# Patient Record
Sex: MALE | Race: WHITE | Employment: OTHER | URBAN - METROPOLITAN AREA
[De-identification: names, ages, dates, MRNs, and addresses within clinical notes are randomized per-mention and may not be internally consistent; named-entity substitution may affect disease eponyms.]

---

## 2017-08-30 ENCOUNTER — HOSPITAL ENCOUNTER (EMERGENCY)
Facility: CLINIC | Age: 82
Discharge: HOME | End: 2017-08-30
Attending: EMERGENCY MEDICINE | Admitting: EMERGENCY MEDICINE
Payer: MEDICARE

## 2017-08-30 ENCOUNTER — APPOINTMENT (OUTPATIENT)
Dept: CT IMAGING | Facility: CLINIC | Age: 82
End: 2017-08-30
Attending: EMERGENCY MEDICINE
Payer: MEDICARE

## 2017-08-30 VITALS
WEIGHT: 165 LBS | DIASTOLIC BLOOD PRESSURE: 78 MMHG | OXYGEN SATURATION: 95 % | RESPIRATION RATE: 18 BRPM | SYSTOLIC BLOOD PRESSURE: 174 MMHG | HEART RATE: 66 BPM | BODY MASS INDEX: 26.52 KG/M2 | TEMPERATURE: 99.3 F | HEIGHT: 66 IN

## 2017-08-30 DIAGNOSIS — S51.012A SKIN TEAR OF LEFT ELBOW WITHOUT COMPLICATION, INITIAL ENCOUNTER: ICD-10-CM

## 2017-08-30 DIAGNOSIS — S61.411A LACERATION OF RIGHT HAND, FOREIGN BODY PRESENCE UNSPECIFIED, INITIAL ENCOUNTER: ICD-10-CM

## 2017-08-30 DIAGNOSIS — S01.81XA LACERATION OF FOREHEAD, INITIAL ENCOUNTER: ICD-10-CM
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
## 2017-08-30 PROCEDURE — 12001 RPR S/N/AX/GEN/TRNK 2.5CM/<: CPT

## 2017-08-30 PROCEDURE — 99284 EMERGENCY DEPT VISIT MOD MDM: CPT | Mod: 25

## 2017-08-30 PROCEDURE — 70450 CT HEAD/BRAIN W/O DYE: CPT

## 2017-08-30 PROCEDURE — 12011 RPR F/E/E/N/L/M 2.5 CM/<: CPT

## 2017-08-30 RX ORDER — GINSENG 100 MG
CAPSULE ORAL
Status: DISCONTINUED
Start: 2017-08-30 | End: 2017-08-30 | Stop reason: HOSPADM

## 2017-08-30 RX ORDER — BUPIVACAINE HYDROCHLORIDE AND EPINEPHRINE 5; 5 MG/ML; UG/ML
INJECTION, SOLUTION PERINEURAL
Status: DISCONTINUED
Start: 2017-08-30 | End: 2017-08-30 | Stop reason: HOSPADM

## 2017-08-30 ASSESSMENT — ENCOUNTER SYMPTOMS
WOUND: 1
BACK PAIN: 0
NECK PAIN: 0

## 2017-08-30 NOTE — ED NOTES
ABCs intact. Pt tripped and fell forward onto kitchen floor. Pt has facial lacerations. Pt has skin tear to L elbow and wound to 3rd R finger. Pt is currently on Xarelto.

## 2017-08-30 NOTE — ED PROVIDER NOTES
"  History     Chief Complaint:  Mechanical Fall     HPI   Neo Marcos is a 85 year old male with a history of HTN and atrial fibrillation currently on Xarelto who presents to the emergency department for evaluation following a mechanical fall. The patient states that he was hurrying to answer the door this afternoon when he tripped, falling forward, striking his forehead and sustaining a laceration to his right hand and skin to his right elbow. He denies any loss of consciousness with this. He denies sustaining any other significant injuries as a result of this incident, including injury to his neck or back.     Allergies:  No Known Allergies     Medications:    Amlodipine  Atenolol  Atorvastatin  Proscar  Lisinopril  Multivitamins  Xarelto    Past Medical History:    atrial fibrillation  HTN  Non senile cataract    Past Surgical History:    ENT surgery  Eye surgery  Hernia repair  Orthopedic surgery    Family History:    No past pertinent family history.    Social History:  Former Smoker   Positive for alcohol use.   Marital Status:   [2]    Review of Systems   Musculoskeletal: Negative for back pain and neck pain.   Skin: Positive for wound.     Physical Exam   First Vitals:  BP: 144/73  Pulse: 66  Temp: 99.3  F (37.4  C)  Resp: 18  Height: 167.6 cm (5' 6\")  Weight: 74.8 kg (165 lb)  SpO2: 98 %      Physical Exam  Vital signs and nursing notes reviewed.     Constitutional: laying on gurney appears comfortable  HENT: Oropharynx is clear and moist, no intraoral injury.  Mid forehead laceration of 2 cm.  Nasal abrasion, without nasal bridge deformity.  No intranasal bleeding  Eyes: Conjunctivae are normal bilaterally. Pupils equal  Neck: normal range of motion, no midline neck pain  Cardiovascular: Normal rate, regular rhythm, normal heart sounds.   Pulmonary/Chest: Effort normal and breath sounds normal. No respiratory distress.   Abdominal: Soft. Bowel sounds are normal. No tenderness to palpation. " No rebound or guarding.   Musculoskeletal: No joint swelling or edema. No midline back pain. No rib pain or extremity long bone or joint injury  Neurological: Alert and oriented. No focal weakness.  No confusion  Skin: Skin is warm and dry. No rash noted. Superficial skin tear to left elbow.  Laceration/skin tear to right hand finger area.   Psych: normal affect      Emergency Department Course   Imaging:  Radiographic findings were communicated with the patient who voiced understanding of the findings.    CT Head without contrast:   1. Atrophy and chronic white matter disease.  2. Nothing acute.  3. No interval change. As per radiology.    Procedures:    Narrative: Procedure: Laceration Repair        LACERATION:  A simple clean 2 cm laceration.      LOCATION:  Forehead      ANESTHESIA:  Local using 0.5% Bupivacaine with epi total of 3 mLs      PREPARATION:  Irrigation and Scrubbing with Normal Saline and Shur Clens      DEBRIDEMENT:  no debridement      CLOSURE:  Wound was closed with One Layer.  Skin closed with 7 x 6.0 Ethylon using interrupted sutures.      Narrative: Procedure: Laceration Repair        LACERATION:  A simple clean 2 cm laceration.      LOCATION:  Right base between second and third fingers      FUNCTION:  Distally sensation, circulation, motor and tendon function are intact.      ANESTHESIA: Local using 0.5% Bupivacaine with epi total of 3 mLs      PREPARATION:  Irrigation and Scrubbing with Normal Saline and Shur Clens      DEBRIDEMENT:  no debridement      CLOSURE:  Wound was closed with One Layer.  Skin closed with 5 x 5.0 Ethylon using interrupted sutures.      Emergency Department Course:  Nursing notes and vitals reviewed. 1349 I performed an exam of the patient as documented above.     The patient was sent for a CT Head while in the emergency department, findings above.     1425 Laceration repairs were performed, as noted above.    Findings and plan explained to the Patient. Patient  discharged home with instructions regarding supportive care, medications, and reasons to return. The importance of close follow-up was reviewed.     Impression & Plan    Medical Decision Making:  The patient is an 84 yo male you presents to ED after sustaining a mechanical fall.  CT head was obtained as patient is on Xarelto, which was negative of intracranial pathology.  Head to toe exam revealed on lacerations without other findings of significant injury.  Lacerations and skin tears were repaired and dressed.  He was advised on potential delayed bleeding with head injuries while on anticoagulation and reasons to return.  Wound care instructions were provided.  He will have sutures removed in 5-7 days.      Diagnosis:    ICD-10-CM   1. Laceration of forehead, initial encounter S01.81XA   2. Fall, initial encounter W19.XXXA   3. Skin tear of left elbow without complication, initial encounter S51.012A   4. Laceration of right hand, foreign body presence unspecified, initial encounter S61.411A       Disposition:  discharged to home  Krysta PEREZ, am serving as a scribe on 8/30/2017 at 1:49 PM to personally document services performed by Jose Gibbons MD based on my observations and the provider's statements to me.     Krysta Brown  8/30/2017   [facility] EMERGENCY DEPARTMENT       Jose Gibbons MD  08/30/17 4027

## 2017-08-30 NOTE — ED AVS SNAPSHOT
[facility] Emergency Department    [address]    [facility] [address]    Phone:  [phone]    Fax:  [phone]                                       Neo Marcos   MRN: [id number]    Department:  [facility] Emergency Department   Date of Visit:  8/30/2017           After Visit Summary Signature Page     I have received my discharge instructions, and my questions have been answered. I have discussed any challenges I see with this plan with the nurse or doctor.    ..........................................................................................................................................  Patient/Patient Representative Signature      ..........................................................................................................................................  Patient Representative Print Name and Relationship to Patient    ..................................................               ................................................  Date                                            Time    ..........................................................................................................................................  Reviewed by Signature/Title    ...................................................              ..............................................  Date                                                            Time

## 2017-08-30 NOTE — DISCHARGE INSTRUCTIONS
Face Laceration: Suture or Tape  A laceration is a cut through the skin. This will require stitches if it is deep. Minor cuts may be treated with surgical tape.    Home care    Your healthcare provider may prescribe an antibiotic. This is to help prevent infection. Follow all instructions for taking this medicine. Take the medicine every day until it is gone or you are told to stop. You should not have any left over.    The healthcare provider may prescribe medicines for pain. Follow instructions for taking them.    Follow the healthcare provider s instructions on how to care for the cut.    Wash your hands with soap and warm water before and after caring for the cut. This helps prevent infection.    If a bandage was applied and it becomes wet or dirty, replace it. Otherwise, leave it in place for the first 24 hours, then change it once a day or as directed.    If sutures were used, clean the wound daily:    After removing the bandage, wash the area with soap and water. Use a wet cotton swab to loosen and remove any blood or crust that forms.    After cleaning, keep the wound clean and dry. Talk with your doctor before applying any antibiotic ointment to the wound. Reapply a fresh bandage.    You may remove the bandage to shower as usual after the first 24 hours, but do not soak the area in water (no swimming) until the sutures are removed.    If surgical tape was used, keep the area clean and dry. If it becomes wet, blot it dry with a towel.    Most facial skin wounds heal without problems. However, an infection sometimes occurs despite proper treatment. Therefore, watch for the signs of infection listed below.  Follow-up care  Follow up with your healthcare provider as advised. Be sure to return for suture removal as directed. Ask your provider how long sutures should remain in place. If surgical tape closures were used, you may remove them yourself when your provider recommends if they have not fallen off  on their own.  When to seek medical advice  Call your healthcare provider right away if any of these occur:    Wound bleeding not controlled by direct pressure    Signs of infection, including increasing pain in the wound, increasing wound redness or swelling, or pus or bad odor coming from the wound    Fever of 100.4 F (38 C) or higher or as directed by your healthcare provider    Stitches come apart or fall out or surgical tape falls off before 5 days    Wound edges re-open    Wound changes colors    Numbness around the wound   Date Last Reviewed: 6/14/2015 2000-2017 [employer]. [address] [place], [place] [address]. All rights reserved. This information is not intended as a substitute for professional medical care. Always follow your healthcare professional's instructions.

## 2017-08-30 NOTE — ED AVS SNAPSHOT
[facility] Emergency Department    [address]    [place] [address]    Phone:  [phone]    Fax:  [phone]                                       Neo Marcos   MRN: [id number]    Department:  [facility] Emergency Department   Date of Visit:  8/30/2017           Patient Information     Date Of Birth          9/5/1931        Your diagnoses for this visit were:     Laceration of forehead, initial encounter     Fall, initial encounter     Skin tear of left elbow without complication, initial encounter     Laceration of right hand, foreign body presence unspecified, initial encounter        You were seen by Jose Gibbons MD.      Follow-up Information     Follow up with [facility], [place].    Specialty:  Family Practice    Why:  For suture removal in 5-7 days on face, 7-10 days on hand    Contact information:    [address] [facility] [place] [address]  [phone]          Discharge Instructions             Face Laceration: Suture or Tape  A laceration is a cut through the skin. This will require stitches if it is deep. Minor cuts may be treated with surgical tape.    Home care    Your healthcare provider may prescribe an antibiotic. This is to help prevent infection. Follow all instructions for taking this medicine. Take the medicine every day until it is gone or you are told to stop. You should not have any left over.    The healthcare provider may prescribe medicines for pain. Follow instructions for taking them.    Follow the healthcare provider s instructions on how to care for the cut.    Wash your hands with soap and warm water before and after caring for the cut. This helps prevent infection.    If a bandage was applied and it becomes wet or dirty, replace it. Otherwise, leave it in place for the first 24 hours, then change it once a day or as directed.    If sutures were used, clean the wound daily:    After removing the bandage, wash the  area with soap and water. Use a wet cotton swab to loosen and remove any blood or crust that forms.    After cleaning, keep the wound clean and dry. Talk with your doctor before applying any antibiotic ointment to the wound. Reapply a fresh bandage.    You may remove the bandage to shower as usual after the first 24 hours, but do not soak the area in water (no swimming) until the sutures are removed.    If surgical tape was used, keep the area clean and dry. If it becomes wet, blot it dry with a towel.    Most facial skin wounds heal without problems. However, an infection sometimes occurs despite proper treatment. Therefore, watch for the signs of infection listed below.  Follow-up care  Follow up with your healthcare provider as advised. Be sure to return for suture removal as directed. Ask your provider how long sutures should remain in place. If surgical tape closures were used, you may remove them yourself when your provider recommends if they have not fallen off on their own.  When to seek medical advice  Call your healthcare provider right away if any of these occur:    Wound bleeding not controlled by direct pressure    Signs of infection, including increasing pain in the wound, increasing wound redness or swelling, or pus or bad odor coming from the wound    Fever of 100.4 F (38 C) or higher or as directed by your healthcare provider    Stitches come apart or fall out or surgical tape falls off before 5 days    Wound edges re-open    Wound changes colors    Numbness around the wound   Date Last Reviewed: 6/14/2015 2000-2017 [employer]. [address] [place] [address]. All rights reserved. This information is not intended as a substitute for professional medical care. Always follow your healthcare professional's instructions.            24 Hour Appointment Hotline       To make an appointment at any [facility], call [phone] ([phone]). If you don't have a family  doctor or clinic, we will help you find one. [place] clinics are conveniently located to serve the needs of you and your family.             Review of your medicines      Our records show that you are taking the medicines listed below. If these are incorrect, please call your family doctor or clinic.        Dose / Directions Last dose taken    amLODIPine 10 MG tablet   Commonly known as:  NORVASC   Dose:  10 mg        Take 10 mg by mouth daily   Refills:  0        amoxicillin 500 MG capsule   Commonly known as:  AMOXIL   Dose:  2000 mg        Take 2,000 mg by mouth once as needed (Prior to dentist appt)   Refills:  0        atenolol 25 MG tablet   Commonly known as:  TENORMIN   Dose:  25 mg        Take 25 mg by mouth daily   Refills:  0        atorvastatin 40 MG tablet   Commonly known as:  LIPITOR   Dose:  40 mg        Take 40 mg by mouth daily   Refills:  0        CALCIUM 600 + D 600-200 MG-UNIT Tabs   Dose:  1 tablet   Generic drug:  Calcium Carb-Cholecalciferol        Take 1 tablet by mouth daily   Refills:  0        finasteride 5 MG tablet   Commonly known as:  PROSCAR   Dose:  5 mg        Take 5 mg by mouth daily   Refills:  0        fish oil-omega-3 fatty acids 1000 MG capsule   Dose:  1 g        Take 1 g by mouth daily   Refills:  0        lisinopril 20 MG tablet   Commonly known as:  PRINIVIL/ZESTRIL   Dose:  20 mg        Take 20 mg by mouth daily   Refills:  0        * multivitamin, therapeutic with minerals Tabs tablet   Dose:  1 tablet        Take 1 tablet by mouth daily   Refills:  0        * PRESERVISION AREDS 2 PO   Dose:  1 capsule        Take 1 capsule by mouth 2 times daily   Refills:  0        rivaroxaban ANTICOAGULANT 15 MG Tabs tablet   Commonly known as:  XARELTO   Dose:  15 mg        Take 1 tablet (15 mg) by mouth daily (with dinner)   Refills:  0        Vitamin D (Cholecalciferol) 1000 UNITS Tabs   Dose:  1000 Units        Take 1,000 Units by mouth daily   Refills:  0        * Notice:  This  list has 2 medication(s) that are the same as other medications prescribed for you. Read the directions carefully, and ask your doctor or other care provider to review them with you.            Procedures and tests performed during your visit     Head CT w/o contrast      Orders Needing Specimen Collection     None      Pending Results     No orders found from 8/28/2017 to 8/31/2017.            Pending Culture Results     No orders found from 8/28/2017 to 8/31/2017.            Pending Results Instructions     If you had any lab results that were not finalized at the time of your Discharge, you can call the ED Lab Result RN at [phone]. You will be contacted by this team for any positive Lab results or changes in treatment. The nurses are available 7 days a week from 10A to 6:30P.  You can leave a message 24 hours per day and they will return your call.        Test Results From Your Hospital Stay        8/30/2017  1:54 PM      Narrative     CT HEAD WITHOUT CONTRAST  8/30/2017 1:29 PM    HISTORY: Fall.    TECHNIQUE: Scans were obtained through the head without IV contrast.   Radiation dose for this scan was reduced using automated exposure  control, adjustment of the mA and/or kV according to patient size, or  iterative reconstruction technique.    COMPARISON: 7/30/2015.    FINDINGS: Moderate atrophy. Mild chronic white matter disease likely  small vessel ischemic change.  No hemorrhage, mass lesion, or focal  area of acute infarction identified. 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 Moderate membrane thickening  left antrum. No bony abnormality.        Impression     IMPRESSION:   1. Atrophy and chronic white matter disease.  2. Nothing acute.  3. No interval change.    JOAN ZAMAN MD                Clinical Quality Measure: Blood Pressure Screening     Your blood pressure was checked while you were in the emergency department today. The last reading we obtained was  BP: 157/69 .  "Please read the guidelines below about what these numbers mean and what you should do about them.  If your systolic blood pressure (the top number) is less than 120 and your diastolic blood pressure (the bottom number) is less than 80, then your blood pressure is normal. There is nothing more that you need to do about it.  If your systolic blood pressure (the top number) is 120-139 or your diastolic blood pressure (the bottom number) is 80-89, your blood pressure may be higher than it should be. You should have your blood pressure rechecked within a year by a primary care provider.  If your systolic blood pressure (the top number) is 140 or greater or your diastolic blood pressure (the bottom number) is 90 or greater, you may have high blood pressure. High blood pressure is treatable, but if left untreated over time it can put you at risk for heart attack, stroke, or kidney failure. You should have your blood pressure rechecked by a primary care provider within the next 4 weeks.  If your provider in the emergency department today gave you specific instructions to follow-up with your doctor or provider even sooner than that, you should follow that instruction and not wait for up to 4 weeks for your follow-up visit.        Thank you for choosing [facility]       Thank you for choosing [facility] for your care. Our goal is always to provide you with excellent care. Hearing back from our patients is one way we can continue to improve our services. Please take a few minutes to complete the written survey that you may receive in the mail after you visit with us. Thank you!        [employer] Information     [employer] lets you send messages to your doctor, view your test results, renew your prescriptions, schedule appointments and more. To sign up, go to [email or web address] . Click on \"Log in\" on the left side of the screen, which will take you to the Welcome page. Then click on \"Sign up Now\" on the right side of the page. "     You will be asked to enter the access code listed below, as well as some personal information. Please follow the directions to create your username and password.     Your access code is: [email or web address]  Expires: 2017  2:55 PM     Your access code will  in 90 days. If you need help or a new code, please call your [facility] clinic or [phone].        Care EveryWhere ID     This is your Care EveryWhere ID. This could be used by other organizations to access your [facility] medical records  [id number]        Equal Access to Services     [facility]: Roscoe Pena, tristian keane, ena glass, roger gilbert . So [facility] [phone].    ATENCIÓN: Si habla español, tiene a newberry disposición servicios gratuitos de asistencia lingüística. Llame al [phone].    We comply with applicable federal civil rights laws and [place] laws. We do not discriminate on the basis of race, color, national origin, age, disability sex, sexual orientation or gender identity.            After Visit Summary       This is your record. Keep this with you and show to your community pharmacist(s) and doctor(s) at your next visit.

## 2017-08-30 NOTE — ED NOTES
Patient discharged to home. Patient and daughter received follow-up information with PCP for suture removal. Patient received discharge instructions and has no other questions at this time.

## 2018-02-27 ENCOUNTER — HOSPITAL ENCOUNTER (EMERGENCY)
Facility: CLINIC | Age: 83
Discharge: HOME | End: 2018-02-28
Attending: EMERGENCY MEDICINE | Admitting: EMERGENCY MEDICINE
Payer: MEDICARE

## 2018-02-27 DIAGNOSIS — Z98.818 SURGICAL WOUND HEMORRHAGE AFTER DENTAL PROCEDURE: ICD-10-CM

## 2018-02-27 DIAGNOSIS — K91.840 SURGICAL WOUND HEMORRHAGE AFTER DENTAL PROCEDURE: ICD-10-CM

## 2018-02-27 PROCEDURE — 64400 NJX AA&/STRD TRIGEMINAL NRV: CPT

## 2018-02-27 PROCEDURE — 85025 COMPLETE CBC W/AUTO DIFF WBC: CPT | Performed by: EMERGENCY MEDICINE

## 2018-02-27 PROCEDURE — 99283 EMERGENCY DEPT VISIT LOW MDM: CPT | Mod: 25

## 2018-02-27 RX ORDER — LIDOCAINE HYDROCHLORIDE AND EPINEPHRINE BITARTRATE 20; .01 MG/ML; MG/ML
INJECTION, SOLUTION SUBCUTANEOUS
Status: DISCONTINUED
Start: 2018-02-27 | End: 2018-02-28 | Stop reason: HOSPADM

## 2018-02-27 RX ORDER — LIDOCAINE HYDROCHLORIDE ANHYDROUS AND EPINEPHRINE 10; 10 MG/ML; UG/ML
30 INJECTION, SOLUTION INFILTRATION ONCE
Status: DISCONTINUED | OUTPATIENT
Start: 2018-02-27 | End: 2018-02-28 | Stop reason: HOSPADM

## 2018-02-27 ASSESSMENT — ENCOUNTER SYMPTOMS
FEVER: 0
DIZZINESS: 0
CHILLS: 0
LIGHT-HEADEDNESS: 0

## 2018-02-27 NOTE — ED AVS SNAPSHOT
[facility] Emergency Department    [address]    [facility] [address]    Phone:  [phone]    Fax:  [phone]                                       Neo Marcos   MRN: [id number]    Department:  [facility] Emergency Department   Date of Visit:  2/27/2018           After Visit Summary Signature Page     I have received my discharge instructions, and my questions have been answered. I have discussed any challenges I see with this plan with the nurse or doctor.    ..........................................................................................................................................  Patient/Patient Representative Signature      ..........................................................................................................................................  Patient Representative Print Name and Relationship to Patient    ..................................................               ................................................  Date                                            Time    ..........................................................................................................................................  Reviewed by Signature/Title    ...................................................              ..............................................  Date                                                            Time

## 2018-02-27 NOTE — ED AVS SNAPSHOT
[facility] Emergency Department    [address]    [facility] [address]    Phone:  [phone]    Fax:  [phone]                                       Neo Marcos   MRN: [id number]    Department:  [facility] Emergency Department   Date of Visit:  2/27/2018           Patient Information     Date Of Birth          9/5/1931        Your diagnoses for this visit were:     Surgical wound hemorrhage after dental procedure        You were seen by Fany Estrella MD.        Discharge Instructions       Follow up with your dentist tomorrow. If your bleeding happens again, return to the ER.        Wound Check After Surgery: Bleeding  Surgery involves cutting through layers of skin, fatty tissue, muscle, and sometimes bone and cartilage. Sutures (stitches) or staples are used to close all layers of the wound. The sutures on the inside will dissolve in about 2 to 3 weeks. Any sutures or staples used on the outside need to be removed in about 7 to 14 days, depending on the location.  It is normal to have some clear or bloody discharge on the wound covering or bandage (dressing) for the first few days after surgery. If your wound was sutured (sewn) closed, you should not have to change the dressing more than three times a day in the first few days. Bleeding or discharge requiring more frequent dressing changes can be a sign of a problem.  Home care  Different types of surgery require different types of care and dressing changes. It is important to follow all instructions and advice from your surgeon, as well as other members of your healthcare team.  Wound care    Keep the wound clean, as directed by your healthcare provider.    Change the dressing as directed. Change the dressing sooner if it becomes wet or stained with blood or fluid from the wound.    Bathe with a sponge (no shower or tub baths) for the first few days after surgery, or until there is no more drainage from  the wound. Unless you received different instructions from your surgeon, you can then shower. Do not soak the area in water (no baths or swimming) until the tape, sutures, or staples are removed and any wound opening has dried out and healed.  Changing the dressing    Wash your hands before changing the dressings.    Carefully remove the dressing and tape; don t just yank it off. If it sticks to the wound, you may need to wet it a little to remove it, unless your healthcare provider told you not to wet it.    Wash your hands again before putting on a new, clean dressing.    Gently clean the wound with clean water (or saline) using gauze or a clean washcloth. Do not rub it or pick at it.    Do not use soap, alcohol, hydrogen peroxide, or any other cleanser.    If you were told to dry the wound before putting on a new dressing, gently pat it dry. Do not rub.    Throw out the old dressing. Do not reuse it!    Wash your hands again when you are done.  Types of dressings  Your healthcare team will tell you what type of dressing to put on your wound. Follow your healthcare team s instructions carefully, and contact them if you have any questions. Two common types of dressings are described below. You may have one of these or another type.    Dry dressing. Use dry gauze. If the wound is still draining, use a  nonadherent  dressing, which shouldn t stick to the wound.    Wet-to-dry dressing. Wet the gauze, and squeeze out the excess water (or saline), before putting it on. Then, cover this with a dry pad.  Medicines    If you were given antibiotics, take them until they are used up or your healthcare provider tells you to stop. It is important to finish the antibiotics even though you feel better, to make sure the infection has cleared.    You can take acetaminophen or ibuprofen for pain, unless you were given a different pain medicine to use. (Note: If you have chronic liver or kidney disease, or have ever had a stomach  ulcer or gastrointestinal bleeding, or are taking blood thinner medicines, talk with your healthcare provider before using these medicines.)    Aspirin should never be used in anyone under 18 years of age who is ill with a fever. It may cause severe liver damage.  Follow-up care  Follow up with your healthcare provider, or as advised, for your next wound check or removal of sutures, staples, or tape.    If a culture was done, you will be notified if the results will affect your treatment. You can call as directed for the results.    If imaging tests, such as X-rays, an ultrasound, or CT scan were done, they will be reviewed by a specialist. You will be notified of the results, especially if they affect treatment.  Call 911  Call emergency services right away if any of these occur:    Trouble breathing or swallowing, wheezing    Hoarse voice or trouble speaking    Extreme confusion    Extreme drowsiness or trouble awakening    Fainting or loss of consciousness    Rapid heart rate or very slow heart rate    Vomiting blood, or large amounts of blood in stool    Discomfort in the center of the chest that feels like pressure, squeezing, a sense of fullness, or pain    Discomfort or pain in other upper body areas, such as the back, one or both arms, neck, jaw, or stomach    Stroke symptoms (spot a stroke  FAST )    F: Face drooping. One side of the face is numb or droops.    A: Arm weakness. One arm feels weak or numb.    S: Speech difficulty: Speech is slurred, or the person is unable to speak.    T: Time to call 911. Even if symptoms go away, call 911.  When to seek medical advice  Call your healthcare provider right away if any of the following occur:    Fluid or blood soaking 5 or more bandages a day during the first 3 days after surgery    Fluid or blood still draining from the wound more than 3 days after surgery    Increasing pain at the site of surgery    Fever over 100.4  F (38.0  C)    Redness around the  wound    Pus coming from the wound    Vomiting, constipation, or diarrhea  Date Last Reviewed: 9/27/2015 2000-2017 [employer]. [address] [place] [address], [place] [address]. All rights reserved. This information is not intended as a substitute for professional medical care. Always follow your healthcare professional's instructions.          24 Hour Appointment Hotline       To make an appointment at any [facility], call [phone] ([phone]). If you don't have a family doctor or clinic, we will help you find one. [facility] are conveniently located to serve the needs of you and your family.             Review of your medicines      Our records show that you are taking the medicines listed below. If these are incorrect, please call your family doctor or clinic.        Dose / Directions Last dose taken    amLODIPine 10 MG tablet   Commonly known as:  NORVASC   Dose:  10 mg        Take 10 mg by mouth daily   Refills:  0        atenolol 25 MG tablet   Commonly known as:  TENORMIN   Dose:  25 mg        Take 25 mg by mouth daily   Refills:  0        atorvastatin 40 MG tablet   Commonly known as:  LIPITOR   Dose:  40 mg        Take 40 mg by mouth daily   Refills:  0        CALCIUM 600 + D 600-200 MG-UNIT Tabs   Dose:  1 tablet   Generic drug:  Calcium Carb-Cholecalciferol        Take 1 tablet by mouth daily   Refills:  0        finasteride 5 MG tablet   Commonly known as:  PROSCAR   Dose:  5 mg        Take 5 mg by mouth daily   Refills:  0        fish oil-omega-3 fatty acids 1000 MG capsule   Dose:  1 g        Take 1 g by mouth daily   Refills:  0        lisinopril 20 MG tablet   Commonly known as:  PRINIVIL/ZESTRIL   Dose:  20 mg        Take 20 mg by mouth daily   Refills:  0        * multivitamin, therapeutic with minerals Tabs tablet   Dose:  1 tablet        Take 1 tablet by mouth daily   Refills:  0        * PRESERVISION AREDS 2 PO   Dose:  1 capsule        Take 1 capsule by mouth 2  times daily   Refills:  0        rivaroxaban ANTICOAGULANT 15 MG Tabs tablet   Commonly known as:  XARELTO   Dose:  15 mg        Take 1 tablet (15 mg) by mouth daily (with dinner)   Refills:  0        Vitamin D (Cholecalciferol) 1000 UNITS Tabs   Dose:  1000 Units        Take 1,000 Units by mouth daily   Refills:  0        * Notice:  This list has 2 medication(s) that are the same as other medications prescribed for you. Read the directions carefully, and ask your doctor or other care provider to review them with you.            Procedures and tests performed during your visit     CBC with platelets differential      Orders Needing Specimen Collection     None      Pending Results     No orders found for last 3 day(s).            Pending Culture Results     No orders found for last 3 day(s).            Pending Results Instructions     If you had any lab results that were not finalized at the time of your Discharge, you can call the ED Lab Result RN at [phone]. You will be contacted by this team for any positive Lab results or changes in treatment. The nurses are available 7 days a week from 10A to 6:30P.  You can leave a message 24 hours per day and they will return your call.        Test Results From Your Hospital Stay        2/28/2018 12:10 AM      Component Results     Component Value Ref Range & Units Status    WBC 6.4 4.0 - 11.0 10e9/L Final    RBC Count 4.20 (L) 4.4 - 5.9 10e12/L Final    Hemoglobin 12.4 (L) 13.3 - 17.7 g/dL Final    Hematocrit 38.5 (L) 40.0 - 53.0 % Final    MCV 92 78 - 100 fl Final    MCH 29.5 26.5 - 33.0 pg Final    MCHC 32.2 31.5 - 36.5 g/dL Final    RDW 14.9 10.0 - 15.0 % Final    Platelet Count 247 150 - 450 10e9/L Final    Diff Method Automated Method  Final    % Neutrophils 59.2 % Final    % Lymphocytes 23.5 % Final    % Monocytes 14.4 % Final    % Eosinophils 1.7 % Final    % Basophils 0.9 % Final    % Immature Granulocytes 0.3 % Final    Nucleated RBCs 0 0 /100 Final    Absolute  Neutrophil 3.8 1.6 - 8.3 10e9/L Final    Absolute Lymphocytes 1.5 0.8 - 5.3 10e9/L Final    Absolute Monocytes 0.9 0.0 - 1.3 10e9/L Final    Absolute Eosinophils 0.1 0.0 - 0.7 10e9/L Final    Absolute Basophils 0.1 0.0 - 0.2 10e9/L Final    Abs Immature Granulocytes 0.0 0 - 0.4 10e9/L Final    Absolute Nucleated RBC 0.0  Final                Clinical Quality Measure: Blood Pressure Screening     Your blood pressure was checked while you were in the emergency department today. The last reading we obtained was  BP: 131/53 . Please read the guidelines below about what these numbers mean and what you should do about them.  If your systolic blood pressure (the top number) is less than 120 and your diastolic blood pressure (the bottom number) is less than 80, then your blood pressure is normal. There is nothing more that you need to do about it.  If your systolic blood pressure (the top number) is 120-139 or your diastolic blood pressure (the bottom number) is 80-89, your blood pressure may be higher than it should be. You should have your blood pressure rechecked within a year by a primary care provider.  If your systolic blood pressure (the top number) is 140 or greater or your diastolic blood pressure (the bottom number) is 90 or greater, you may have high blood pressure. High blood pressure is treatable, but if left untreated over time it can put you at risk for heart attack, stroke, or kidney failure. You should have your blood pressure rechecked by a primary care provider within the next 4 weeks.  If your provider in the emergency department today gave you specific instructions to follow-up with your doctor or provider even sooner than that, you should follow that instruction and not wait for up to 4 weeks for your follow-up visit.        Thank you for choosing [facility]       Thank you for choosing [facility] for your care. Our goal is always to provide you with excellent care. Hearing back from our patients is one  "way we can continue to improve our services. Please take a few minutes to complete the written survey that you may receive in the mail after you visit with us. Thank you!        [employer] Information     [employer] lets you send messages to your doctor, view your test results, renew your prescriptions, schedule appointments and more. To sign up, go to [email or web address] . Click on \"Log in\" on the left side of the screen, which will take you to the Welcome page. Then click on \"Sign up Now\" on the right side of the page.     You will be asked to enter the access code listed below, as well as some personal information. Please follow the directions to create your username and password.     Your access code is: [email or web address]  Expires: 2018  1:46 AM     Your access code will  in 90 days. If you need help or a new code, please call your [place] clinic or [phone].        Care EveryWhere ID     This is your Care EveryWhere ID. This could be used by other organizations to access your [place] medical records  [id number]        Equal Access to Services     SEBLE BRADY : Hadluciano Pena, tristian keane, roger mullins. So [facility] [phone].    ATENCIÓN: Si habla español, tiene a newberry disposición servicios gratuitos de asistencia lingüística. Kaylyn al [phone].    We comply with applicable federal civil rights laws and [place] laws. We do not discriminate on the basis of race, color, national origin, age, disability, sex, sexual orientation, or gender identity.            After Visit Summary       This is your record. Keep this with you and show to your community pharmacist(s) and doctor(s) at your next visit.                  "

## 2018-02-28 VITALS
SYSTOLIC BLOOD PRESSURE: 131 MMHG | OXYGEN SATURATION: 97 % | TEMPERATURE: 97 F | RESPIRATION RATE: 16 BRPM | HEART RATE: 67 BPM | DIASTOLIC BLOOD PRESSURE: 53 MMHG

## 2018-02-28 LAB
BASOPHILS # BLD AUTO: 0.1 10E9/L (ref 0–0.2)
BASOPHILS NFR BLD AUTO: 0.9 %
DIFFERENTIAL METHOD BLD: ABNORMAL
EOSINOPHIL # BLD AUTO: 0.1 10E9/L (ref 0–0.7)
EOSINOPHIL NFR BLD AUTO: 1.7 %
ERYTHROCYTE [DISTWIDTH] IN BLOOD BY AUTOMATED COUNT: 14.9 % (ref 10–15)
HCT VFR BLD AUTO: 38.5 % (ref 40–53)
HGB BLD-MCNC: 12.4 G/DL (ref 13.3–17.7)
IMM GRANULOCYTES # BLD: 0 10E9/L (ref 0–0.4)
IMM GRANULOCYTES NFR BLD: 0.3 %
LYMPHOCYTES # BLD AUTO: 1.5 10E9/L (ref 0.8–5.3)
LYMPHOCYTES NFR BLD AUTO: 23.5 %
MCH RBC QN AUTO: 29.5 PG (ref 26.5–33)
MCHC RBC AUTO-ENTMCNC: 32.2 G/DL (ref 31.5–36.5)
MCV RBC AUTO: 92 FL (ref 78–100)
MONOCYTES # BLD AUTO: 0.9 10E9/L (ref 0–1.3)
MONOCYTES NFR BLD AUTO: 14.4 %
NEUTROPHILS # BLD AUTO: 3.8 10E9/L (ref 1.6–8.3)
NEUTROPHILS NFR BLD AUTO: 59.2 %
NRBC # BLD AUTO: 0 10*3/UL
NRBC BLD AUTO-RTO: 0 /100
PLATELET # BLD AUTO: 247 10E9/L (ref 150–450)
RBC # BLD AUTO: 4.2 10E12/L (ref 4.4–5.9)
WBC # BLD AUTO: 6.4 10E9/L (ref 4–11)

## 2018-02-28 RX ORDER — TRANEXAMIC ACID 100 MG/ML
1 INJECTION, SOLUTION INTRAVENOUS ONCE
Status: DISCONTINUED | OUTPATIENT
Start: 2018-02-28 | End: 2018-02-28 | Stop reason: HOSPADM

## 2018-02-28 NOTE — DISCHARGE INSTRUCTIONS
Follow up with your dentist tomorrow. If your bleeding happens again, return to the ER.        Wound Check After Surgery: Bleeding  Surgery involves cutting through layers of skin, fatty tissue, muscle, and sometimes bone and cartilage. Sutures (stitches) or staples are used to close all layers of the wound. The sutures on the inside will dissolve in about 2 to 3 weeks. Any sutures or staples used on the outside need to be removed in about 7 to 14 days, depending on the location.  It is normal to have some clear or bloody discharge on the wound covering or bandage (dressing) for the first few days after surgery. If your wound was sutured (sewn) closed, you should not have to change the dressing more than three times a day in the first few days. Bleeding or discharge requiring more frequent dressing changes can be a sign of a problem.  Home care  Different types of surgery require different types of care and dressing changes. It is important to follow all instructions and advice from your surgeon, as well as other members of your healthcare team.  Wound care    Keep the wound clean, as directed by your healthcare provider.    Change the dressing as directed. Change the dressing sooner if it becomes wet or stained with blood or fluid from the wound.    Bathe with a sponge (no shower or tub baths) for the first few days after surgery, or until there is no more drainage from the wound. Unless you received different instructions from your surgeon, you can then shower. Do not soak the area in water (no baths or swimming) until the tape, sutures, or staples are removed and any wound opening has dried out and healed.  Changing the dressing    Wash your hands before changing the dressings.    Carefully remove the dressing and tape; don t just yank it off. If it sticks to the wound, you may need to wet it a little to remove it, unless your healthcare provider told you not to wet it.    Wash your hands again before putting on  a new, clean dressing.    Gently clean the wound with clean water (or saline) using gauze or a clean washcloth. Do not rub it or pick at it.    Do not use soap, alcohol, hydrogen peroxide, or any other cleanser.    If you were told to dry the wound before putting on a new dressing, gently pat it dry. Do not rub.    Throw out the old dressing. Do not reuse it!    Wash your hands again when you are done.  Types of dressings  Your healthcare team will tell you what type of dressing to put on your wound. Follow your healthcare team s instructions carefully, and contact them if you have any questions. Two common types of dressings are described below. You may have one of these or another type.    Dry dressing. Use dry gauze. If the wound is still draining, use a  nonadherent  dressing, which shouldn t stick to the wound.    Wet-to-dry dressing. Wet the gauze, and squeeze out the excess water (or saline), before putting it on. Then, cover this with a dry pad.  Medicines    If you were given antibiotics, take them until they are used up or your healthcare provider tells you to stop. It is important to finish the antibiotics even though you feel better, to make sure the infection has cleared.    You can take acetaminophen or ibuprofen for pain, unless you were given a different pain medicine to use. (Note: If you have chronic liver or kidney disease, or have ever had a stomach ulcer or gastrointestinal bleeding, or are taking blood thinner medicines, talk with your healthcare provider before using these medicines.)    Aspirin should never be used in anyone under 18 years of age who is ill with a fever. It may cause severe liver damage.  Follow-up care  Follow up with your healthcare provider, or as advised, for your next wound check or removal of sutures, staples, or tape.    If a culture was done, you will be notified if the results will affect your treatment. You can call as directed for the results.    If imaging tests,  such as X-rays, an ultrasound, or CT scan were done, they will be reviewed by a specialist. You will be notified of the results, especially if they affect treatment.  Call 911  Call emergency services right away if any of these occur:    Trouble breathing or swallowing, wheezing    Hoarse voice or trouble speaking    Extreme confusion    Extreme drowsiness or trouble awakening    Fainting or loss of consciousness    Rapid heart rate or very slow heart rate    Vomiting blood, or large amounts of blood in stool    Discomfort in the center of the chest that feels like pressure, squeezing, a sense of fullness, or pain    Discomfort or pain in other upper body areas, such as the back, one or both arms, neck, jaw, or stomach    Stroke symptoms (spot a stroke  FAST )    F: Face drooping. One side of the face is numb or droops.    A: Arm weakness. One arm feels weak or numb.    S: Speech difficulty: Speech is slurred, or the person is unable to speak.    T: Time to call 911. Even if symptoms go away, call 911.  When to seek medical advice  Call your healthcare provider right away if any of the following occur:    Fluid or blood soaking 5 or more bandages a day during the first 3 days after surgery    Fluid or blood still draining from the wound more than 3 days after surgery    Increasing pain at the site of surgery    Fever over 100.4  F (38.0  C)    Redness around the wound    Pus coming from the wound    Vomiting, constipation, or diarrhea  Date Last Reviewed: 9/27/2015 2000-2017 [employer]. [address] [place] [address], [place] [address]. All rights reserved. This information is not intended as a substitute for professional medical care. Always follow your healthcare professional's instructions.

## 2018-02-28 NOTE — ED NOTES
Pt had all of his teeth pulled a week ago, pt had a follow up yesterday and had an area packed with gauze. Today bleeding was off and on. Pt has not been able to get the bleeding to stop since about 8 pm. Pt is on blood thinners.

## 2018-02-28 NOTE — ED PROVIDER NOTES
History     Chief Complaint:  Mouth Problem    HPI   Neo Marcos is a 86 year old male on Xarelto, with a history of atrial fibrillation and hypertension, who presents to the emergency department today for evaluation of mouth problem. 1 week ago, the patient had all of his teeth extracted. He stopped taking Xarelto prior to the surgery, but resumed taking Xarelto and all of his other medications the following day after the surgery. He had a follow-up evaluation with the oral surgeon yesterday, who noticed some bleeding in his upper left gums. The site was cauterized and the bleeding was successfully stopped. This morning, the patient had bleeding in his mouth and he was able to stop the bleeding at home with application of pressure. At 2000 tonight, the bleeding started again and he could not stop the bleeding with application of pressure, prompting his visit to the emergency department. While in the emergency department, the bleeding stopped with application of gauze. He otherwise denies fever, chills, mouth pain, lightheadedness, or dizziness. He denies any recent trauma to his mouth that could have caused the bleeds. He does not take a daily Aspirin.     Allergies:  Drug allergies reviewed. No pertinent drug allergies.     Medications:    amLODIPine (NORVASC) 10 MG tablet  atenolol (TENORMIN) 25 MG tablet  atorvastatin (LIPITOR) 40 MG tablet  Vitamin D, Cholecalciferol, 1000 UNITS TABS  finasteride (PROSCAR) 5 MG tablet  lisinopril (PRINIVIL,ZESTRIL) 20 MG tablet  multivitamin, therapeutic with minerals (THERA-VIT-M) TABS  fish oil-omega-3 fatty acids 1000 MG capsule  Multiple Vitamins-Minerals (PRESERVISION AREDS 2 PO)  Calcium Carb-Cholecalciferol (CALCIUM 600 + D) 600-200 MG-UNIT TABS  rivaroxaban ANTICOAGULANT (XARELTO) 15 MG TABS tablet    Past Medical History:    Atrial fibrillation   Hypertension   Nonsenile cataract     Past Surgical History:    ENT surgery   Eye surgery   Hernia  repair  Orthopedic surgery     Family History:    Family history reviewed. No pertinent family history.     Social History:  The patient was accompanied to the ED by family.  Smoking Status: Former Smoker  Alcohol Use: Positive  Marital Status:       Review of Systems   Constitutional: Negative for chills and fever.   HENT: Positive for dental problem.    Neurological: Negative for dizziness and light-headedness.   All other systems reviewed and are negative.    Physical Exam     Patient Vitals for the past 24 hrs:   BP Temp Pulse Resp SpO2   02/28/18 0153 131/53 - 67 16 97 %   02/28/18 0115 131/53 - - - -   02/28/18 0100 135/44 - - - 94 %   02/28/18 0045 116/54 - - - 96 %   02/28/18 0030 119/57 - - - 95 %   02/27/18 2345 - - - - 96 %   02/27/18 2330 - - - - 96 %   02/27/18 2315 - - - - 97 %   02/27/18 2300 - - - - 95 %   02/27/18 2245 - - - - 96 %   02/27/18 2230 - - - - 98 %   02/27/18 2219 143/62 97  F (36.1  C) 71 18 98 %      Physical Exam  Constitutional:  Well developed, Well nourished   HENT:  Bilateral external ears normal, Mucous membranes moist, Nose normal. Neck- Normal range of motion, Supple. All teeth are missing. Slightly gaping wound over the 2 lower central incisors with brisk bleeding from this area. No significant underlying fluctuances. No other intraoral source of bleeding.  Bleeding persisted despite application of Surgicel.  Respiratory:  Normal breath sounds, No respiratory distress, No wheezing,  Cardiovascular:  Normal heart rate, Normal rhythm, No murmurs,    GI:  Bowel sounds normal, Soft, No tenderness,   Musculoskeletal:  Intact distal pulses, No edema, grossly unremarkable range of motion   Integument:  Warm, Dry. Contusions over the soft tissue of the chin and anterior neck.  Neurologic:  Alert, attentive and appropriately oriented  Psychiatric:  Mood and affect normal.     Emergency Department Course     Laboratory:  Laboratory findings were communicated with the patient who  voiced understanding of the findings.    CBC: WBC 6.4, HGB 12.4 (L),     Procedures:    Dental Block      INDICATIONS: Dental bleed     LOCATION: Lower central incisors    ANESTHESIA: Left inferior alveolar nerve block using 2 mLs Lidocaine with Epinephrine.     PATIENT STATUS: The patient tolerated the procedure well and there were no immediate complications. There was decreased bleeding after the procedure.     Emergency Department Course:    Nursing notes and vitals reviewed.    2321 I performed an exam of the patient as documented above.     2330 I performed a dental block per procedure above.    IV was inserted and blood was drawn for laboratory testing, results above.     TXA was applied topically. There was minimal oozing at this time. No bleeding after 20 minutes application    I personally reviewed the lab results with the patient and answered all related questions prior to discharge. I discussed the treatment plan with the patient. They expressed understanding of this plan and consented to discharge. They will be discharged home with instructions for care and follow up. In addition, the patient will return to the emergency department if their symptoms persist, worsen, if new symptoms arise or if there is any concern.  All questions were answered.     Impression & Plan      Medical Decision Making:  Neo Marcos is a 86 year old male who presents to the emergency department today for evaluation of recurrent bleeding after dental surgery.  He is on Xarelto which likely made him more susceptible recurrent bleeding.  He was briskly bleeding from the surgery site over his lower central incisors and this did not respond to direct pressure or Surgicel application.  Bleeding eventually was controlled after lidocaine with epinephrine topical medication was applied and after TXA was applied.  Because of the degree of bleeding, hemoglobin was checked and this was fortunately unremarkable.  Patient had  no lightheadedness or unsteadiness on reexam.  They will follow up with his dentist tomorrow.  They will return here for any recurrent bleeding.  He and his daughter were comfortable with plan.    Diagnosis:    ICD-10-CM    1. Surgical wound hemorrhage after dental procedure K91.840      Disposition:   The patient is discharged to home.     Critical Care time was 30 minutes for this patient excluding procedures.     Scribe Disclosure:  I, Nola Campos, am serving as a scribe at 10:52 PM on 2/27/2018 to document services personally performed by Fany Estrella MD, based on my observations and the provider's statements to me.      [facility] EMERGENCY DEPARTMENT       Fany Estrella MD  02/28/18 1855

## 2018-06-28 ENCOUNTER — HOSPITAL ENCOUNTER (EMERGENCY)
Facility: CLINIC | Age: 83
Discharge: HOME | End: 2018-06-28
Attending: EMERGENCY MEDICINE | Admitting: EMERGENCY MEDICINE
Payer: MEDICARE

## 2018-06-28 VITALS
DIASTOLIC BLOOD PRESSURE: 83 MMHG | OXYGEN SATURATION: 98 % | SYSTOLIC BLOOD PRESSURE: 185 MMHG | TEMPERATURE: 97 F | HEART RATE: 93 BPM

## 2018-06-28 DIAGNOSIS — R31.0 GROSS HEMATURIA: ICD-10-CM

## 2018-06-28 LAB
ALBUMIN UR-MCNC: 100 MG/DL
ANION GAP SERPL CALCULATED.3IONS-SCNC: 9 MMOL/L (ref 3–14)
APPEARANCE UR: CLEAR
BACTERIA #/AREA URNS HPF: ABNORMAL /HPF
BASOPHILS # BLD AUTO: 0.1 10E9/L (ref 0–0.2)
BASOPHILS NFR BLD AUTO: 1.1 %
BILIRUB UR QL STRIP: NEGATIVE
BUN SERPL-MCNC: 14 MG/DL (ref 7–30)
CALCIUM SERPL-MCNC: 8.8 MG/DL (ref 8.5–10.1)
CHLORIDE SERPL-SCNC: 102 MMOL/L (ref 94–109)
CO2 SERPL-SCNC: 21 MMOL/L (ref 20–32)
COLOR UR AUTO: ABNORMAL
CREAT SERPL-MCNC: 0.7 MG/DL (ref 0.66–1.25)
DIFFERENTIAL METHOD BLD: ABNORMAL
EOSINOPHIL # BLD AUTO: 0.1 10E9/L (ref 0–0.7)
EOSINOPHIL NFR BLD AUTO: 1.4 %
ERYTHROCYTE [DISTWIDTH] IN BLOOD BY AUTOMATED COUNT: 15.7 % (ref 10–15)
GFR SERPL CREATININE-BSD FRML MDRD: >90 ML/MIN/1.7M2
GLUCOSE SERPL-MCNC: 95 MG/DL (ref 70–99)
GLUCOSE UR STRIP-MCNC: NEGATIVE MG/DL
HCT VFR BLD AUTO: 43.2 % (ref 40–53)
HGB BLD-MCNC: 14.3 G/DL (ref 13.3–17.7)
HGB UR QL STRIP: ABNORMAL
IMM GRANULOCYTES # BLD: 0 10E9/L (ref 0–0.4)
IMM GRANULOCYTES NFR BLD: 0.6 %
KETONES UR STRIP-MCNC: NEGATIVE MG/DL
LEUKOCYTE ESTERASE UR QL STRIP: ABNORMAL
LYMPHOCYTES # BLD AUTO: 1.1 10E9/L (ref 0.8–5.3)
LYMPHOCYTES NFR BLD AUTO: 17.2 %
MCH RBC QN AUTO: 30.1 PG (ref 26.5–33)
MCHC RBC AUTO-ENTMCNC: 33.1 G/DL (ref 31.5–36.5)
MCV RBC AUTO: 91 FL (ref 78–100)
MONOCYTES # BLD AUTO: 0.8 10E9/L (ref 0–1.3)
MONOCYTES NFR BLD AUTO: 12.1 %
NEUTROPHILS # BLD AUTO: 4.4 10E9/L (ref 1.6–8.3)
NEUTROPHILS NFR BLD AUTO: 67.6 %
NITRATE UR QL: NEGATIVE
NRBC # BLD AUTO: 0 10*3/UL
NRBC BLD AUTO-RTO: 0 /100
PH UR STRIP: 6 PH (ref 5–7)
PLATELET # BLD AUTO: 193 10E9/L (ref 150–450)
POTASSIUM SERPL-SCNC: 3.4 MMOL/L (ref 3.4–5.3)
RBC # BLD AUTO: 4.75 10E12/L (ref 4.4–5.9)
RBC #/AREA URNS AUTO: 29 /HPF (ref 0–2)
SODIUM SERPL-SCNC: 132 MMOL/L (ref 133–144)
SOURCE: ABNORMAL
SP GR UR STRIP: 1 (ref 1–1.03)
SQUAMOUS #/AREA URNS AUTO: <1 /HPF (ref 0–1)
UROBILINOGEN UR STRIP-MCNC: 0 MG/DL (ref 0–2)
WBC # BLD AUTO: 6.4 10E9/L (ref 4–11)
WBC #/AREA URNS AUTO: 18 /HPF (ref 0–5)

## 2018-06-28 PROCEDURE — 85025 COMPLETE CBC W/AUTO DIFF WBC: CPT | Performed by: EMERGENCY MEDICINE

## 2018-06-28 PROCEDURE — 80048 BASIC METABOLIC PNL TOTAL CA: CPT | Performed by: EMERGENCY MEDICINE

## 2018-06-28 PROCEDURE — 81001 URINALYSIS AUTO W/SCOPE: CPT | Performed by: EMERGENCY MEDICINE

## 2018-06-28 PROCEDURE — 99283 EMERGENCY DEPT VISIT LOW MDM: CPT

## 2018-06-28 RX ORDER — CEPHALEXIN 500 MG/1
500 CAPSULE ORAL 4 TIMES DAILY
Qty: 20 CAPSULE | Refills: 0 | Status: SHIPPED | OUTPATIENT
Start: 2018-06-28 | End: 2018-07-03

## 2018-06-28 ASSESSMENT — ENCOUNTER SYMPTOMS
HEMATURIA: 1
FEVER: 0

## 2018-06-28 NOTE — ED AVS SNAPSHOT
[facility] Emergency Department    [address]    [facility] [address]    Phone:  [phone]    Fax:  [phone]                                       Neo Marcos   MRN: [id number]    Department:  [facility] Emergency Department   Date of Visit:  6/28/2018           After Visit Summary Signature Page     I have received my discharge instructions, and my questions have been answered. I have discussed any challenges I see with this plan with the nurse or doctor.    ..........................................................................................................................................  Patient/Patient Representative Signature      ..........................................................................................................................................  Patient Representative Print Name and Relationship to Patient    ..................................................               ................................................  Date                                            Time    ..........................................................................................................................................  Reviewed by Signature/Title    ...................................................              ..............................................  Date                                                            Time

## 2018-06-28 NOTE — ED AVS SNAPSHOT
[facility] Emergency Department    [address] [place] [address]    Phone:  [phone]    Fax:  [phone]                                       Neo Marcos   MRN: [id number]    Department:  [facility] Emergency Department   Date of Visit:  6/28/2018           Patient Information     Date Of Birth          9/5/1931        Your diagnoses for this visit were:     Gross hematuria        You were seen by Yelitza Bundy MD.      Follow-up Information     Follow up with [facility], [place] In 3 days.    Specialty:  Family Practice    Contact information:    [id number]   [place] [address]  [phone]          Discharge Instructions         Blood in the Urine    Blood in the urine (hematuria) has many possible causes. If it occurs after an injury (such as a car accident or fall), it is most often a sign of bruising to the kidney or bladder. Common causes of blood in the urine include urinary tract infections, kidney stones, inflammation, tumors, or certain other diseases of the kidney or bladder. Menstruation can cause blood to appear in the urine sample, although it is not coming from the urinary tract.  If only a trace amount of blood is present, it will show up on the urine test, even though the urine may be yellow and not pink or red. This may occur with any of the above conditions, as well as heavy exercise or high fever. In this case, your doctor may want to repeat the urine test on another day. This will show if the blood is still present. If it is, then other tests can be done to find out the cause.  Home care  Follow these home care guidelines:    If your urine does not appear bloody (pink, brown or red) then you do not need to restrict your activity in any way.    If you can see blood in your urine, rest and avoid heavy exertion until your next exam. Do not use aspirin, blood thinners, or anti-platelet or anti-inflammatory medicines.  These include ibuprofen and naproxen. These thin the blood and may increase bleeding.  Follow-up care  Follow up with your healthcare provider, or as advised. If you were injured and had blood in your urine, you should have a repeat urine test in 1 to 2 days. Contact your doctor for this test.  A radiologist will review any X-rays that were taken. You will be told of any new findings that may affect your care.  When to seek medical advice  Call your healthcare provider right away if any of these occur:    Bright red blood or blood clots in the urine (if you did not have this before)    Weakness, dizziness or fainting    New groin, abdominal, or back pain    Fever of 100.4 F (38 C) or higher, or as directed by your healthcare provider    Repeated vomiting    Bleeding from the nose or gums or easy bruising  Date Last Reviewed: 9/1/2016 2000-2017 [employer]. [address] [place] [address]. All rights reserved. This information is not intended as a substitute for professional medical care. Always follow your healthcare professional's instructions.          Hematuria: Possible Causes     Many things can lead to blood in the urine (hematuria). The blood may be seen with the eye (macroscopic or gross hematuria). Or it may only be seen when the urine is looked at under a microscope (microscopic hematuria). Some of the most common causes of blood in the urine are listed below. Often, no cause for the blood can be found. This is called idiopathic hematuria.    Kidney or bladder stones are collections of crystals. They form in the urine. Stones may be found anywhere in the urinary tract. But they form most often in the kidneys or bladder. In addition to blood in the urine, they can cause severe pain.    BPH stands for benign prostatic hyperplasia. It is enlargement of the prostate gland. It happens as men age. BPH often causes problems with urination. It sometimes causes blood in the urine.    A  urinary tract infection (UTI) is due to bacteria growing in the urinary tract. It can cause blood in the urine. Other symptoms include burning or pain with urination. You may need to urinate often or urgently. You may also have a fever.    Damage to the urinary tract may cause blood in the urine. This damage may be due to a blow or accident. It may also result from the use of a urinary catheter. Very hard exercise may sometimes irritate the urinary tract and cause bleeding.    Cancer may occur anywhere in the urinary tract. A tumor may sometimes cause no symptoms other than bleeding.     Other possible causes of bleeding include:    Prostatitis (infection of the prostate gland)    Taking anticoagulants    Blockage in the urinary tract    Disease or inflammation of the kidney    Cystic diseases of the kidneys    Sickle cell anemia    Vigorous exercise    Endometriosis  Date Last Reviewed: 12/1/2016 2000-2017 [employer]. [address] [place] [address]. All rights reserved. This information is not intended as a substitute for professional medical care. Always follow your healthcare professional's instructions.          24 Hour Appointment Hotline       To make an appointment at any [facility], call [phone] ([phone]). If you don't have a family doctor or clinic, we will help you find one. [facility] clinics are conveniently located to serve the needs of you and your family.             Review of your medicines      START taking        Dose / Directions Last dose taken    cephALEXin 500 MG capsule   Commonly known as:  KEFLEX   Dose:  500 mg   Quantity:  20 capsule        Take 1 capsule (500 mg) by mouth 4 times daily for 5 days   Refills:  0          Our records show that you are taking the medicines listed below. If these are incorrect, please call your family doctor or clinic.        Dose / Directions Last dose taken    amLODIPine 10 MG tablet   Commonly known as:  NORVASC    Dose:  10 mg        Take 10 mg by mouth daily   Refills:  0        atenolol 25 MG tablet   Commonly known as:  TENORMIN   Dose:  25 mg        Take 25 mg by mouth daily   Refills:  0        atorvastatin 40 MG tablet   Commonly known as:  LIPITOR   Dose:  40 mg        Take 40 mg by mouth daily   Refills:  0        CALCIUM 600 + D 600-200 MG-UNIT Tabs   Dose:  1 tablet   Generic drug:  Calcium Carb-Cholecalciferol        Take 1 tablet by mouth daily   Refills:  0        finasteride 5 MG tablet   Commonly known as:  PROSCAR   Dose:  5 mg        Take 5 mg by mouth daily   Refills:  0        fish oil-omega-3 fatty acids 1000 MG capsule   Dose:  1 g        Take 1 g by mouth daily   Refills:  0        lisinopril 20 MG tablet   Commonly known as:  PRINIVIL/ZESTRIL   Dose:  20 mg        Take 20 mg by mouth daily   Refills:  0        * multivitamin, therapeutic with minerals Tabs tablet   Dose:  1 tablet        Take 1 tablet by mouth daily   Refills:  0        * PRESERVISION AREDS 2 PO   Dose:  1 capsule        Take 1 capsule by mouth 2 times daily   Refills:  0        rivaroxaban ANTICOAGULANT 15 MG Tabs tablet   Commonly known as:  XARELTO   Dose:  15 mg        Take 1 tablet (15 mg) by mouth daily (with dinner)   Refills:  0        Vitamin D (Cholecalciferol) 1000 units Tabs   Dose:  1000 Units        Take 1,000 Units by mouth daily   Refills:  0        * Notice:  This list has 2 medication(s) that are the same as other medications prescribed for you. Read the directions carefully, and ask your doctor or other care provider to review them with you.            Prescriptions were sent or printed at these locations (1 Prescription)                   Other Prescriptions                Printed at Department/Unit printer (1 of 1)         cephALEXin (KEFLEX) 500 MG capsule                Procedures and tests performed during your visit     Basic metabolic panel    CBC + differential    UA with Microscopic      Orders Needing  Specimen Collection     Ordered          06/28/18 2334  Urine Culture - ROUTINE, Prio: Routine, Status: Sent     Scheduled Task Status   06/28/18 2337 [employer] [facility] Reminder: Open   Order Class:  PCU Collect                  Pending Results     No orders found from 6/26/2018 to 6/29/2018.            Pending Culture Results     No orders found from 6/26/2018 to 6/29/2018.            Pending Results Instructions     If you had any lab results that were not finalized at the time of your Discharge, you can call the ED Lab Result RN at [phone]. You will be contacted by this team for any positive Lab results or changes in treatment. The nurses are available 7 days a week from 10A to 6:30P.  You can leave a message 24 hours per day and they will return your call.        Test Results From Your Hospital Stay        6/28/2018 10:53 PM      Component Results     Component Value Ref Range & Units Status    Color Urine Amalia  Final    Appearance Urine Clear  Final    Glucose Urine Negative NEG^Negative mg/dL Final    Bilirubin Urine Negative NEG^Negative Final    Ketones Urine Negative NEG^Negative mg/dL Final    Specific Gravity Urine 1.001 (L) 1.003 - 1.035 Final    Blood Urine Large (A) NEG^Negative Final    pH Urine 6.0 5.0 - 7.0 pH Final    Protein Albumin Urine 100 (A) NEG^Negative mg/dL Final    Urobilinogen mg/dL 0.0 0.0 - 2.0 mg/dL Final    Nitrite Urine Negative NEG^Negative Final    Leukocyte Esterase Urine Moderate (A) NEG^Negative Final    Source Midstream Urine  Final    WBC Urine 18 (H) 0 - 5 /HPF Final    RBC Urine 29 (H) 0 - 2 /HPF Final    Bacteria Urine Few (A) NEG^Negative /HPF Final    Squamous Epithelial /HPF Urine <1 0 - 1 /HPF Final         6/28/2018 11:17 PM      Component Results     Component Value Ref Range & Units Status    WBC 6.4 4.0 - 11.0 10e9/L Final    RBC Count 4.75 4.4 - 5.9 10e12/L Final    Hemoglobin 14.3 13.3 - 17.7 g/dL Final    Hematocrit 43.2 40.0 - 53.0 % Final    MCV 91 78 - 100 fl  Final    MCH 30.1 26.5 - 33.0 pg Final    MCHC 33.1 31.5 - 36.5 g/dL Final    RDW 15.7 (H) 10.0 - 15.0 % Final    Platelet Count 193 150 - 450 10e9/L Final    Diff Method Automated Method  Final    % Neutrophils 67.6 % Final    % Lymphocytes 17.2 % Final    % Monocytes 12.1 % Final    % Eosinophils 1.4 % Final    % Basophils 1.1 % Final    % Immature Granulocytes 0.6 % Final    Nucleated RBCs 0 0 /100 Final    Absolute Neutrophil 4.4 1.6 - 8.3 10e9/L Final    Absolute Lymphocytes 1.1 0.8 - 5.3 10e9/L Final    Absolute Monocytes 0.8 0.0 - 1.3 10e9/L Final    Absolute Eosinophils 0.1 0.0 - 0.7 10e9/L Final    Absolute Basophils 0.1 0.0 - 0.2 10e9/L Final    Abs Immature Granulocytes 0.0 0 - 0.4 10e9/L Final    Absolute Nucleated RBC 0.0  Final         6/28/2018 11:30 PM      Component Results     Component Value Ref Range & Units Status    Sodium 132 (L) 133 - 144 mmol/L Final    Potassium 3.4 3.4 - 5.3 mmol/L Final    Chloride 102 94 - 109 mmol/L Final    Carbon Dioxide 21 20 - 32 mmol/L Final    Anion Gap 9 3 - 14 mmol/L Final    Glucose 95 70 - 99 mg/dL Final    Urea Nitrogen 14 7 - 30 mg/dL Final    Creatinine 0.70 0.66 - 1.25 mg/dL Final    GFR Estimate >90 >60 mL/min/1.7m2 Final    Non  GFR Calc    GFR Estimate If Black >90 >60 mL/min/1.7m2 Final    African American GFR Calc    Calcium 8.8 8.5 - 10.1 mg/dL Final                Clinical Quality Measure: Blood Pressure Screening     Your blood pressure was checked while you were in the emergency department today. The last reading we obtained was  BP: 185/83 . Please read the guidelines below about what these numbers mean and what you should do about them.  If your systolic blood pressure (the top number) is less than 120 and your diastolic blood pressure (the bottom number) is less than 80, then your blood pressure is normal. There is nothing more that you need to do about it.  If your systolic blood pressure (the top number) is 120-139 or your  "diastolic blood pressure (the bottom number) is 80-89, your blood pressure may be higher than it should be. You should have your blood pressure rechecked within a year by a primary care provider.  If your systolic blood pressure (the top number) is 140 or greater or your diastolic blood pressure (the bottom number) is 90 or greater, you may have high blood pressure. High blood pressure is treatable, but if left untreated over time it can put you at risk for heart attack, stroke, or kidney failure. You should have your blood pressure rechecked by a primary care provider within the next 4 weeks.  If your provider in the emergency department today gave you specific instructions to follow-up with your doctor or provider even sooner than that, you should follow that instruction and not wait for up to 4 weeks for your follow-up visit.        Thank you for choosing [facility]       Thank you for choosing [facility] for your care. Our goal is always to provide you with excellent care. Hearing back from our patients is one way we can continue to improve our services. Please take a few minutes to complete the written survey that you may receive in the mail after you visit with us. Thank you!        [employer] Information     [employer] lets you send messages to your doctor, view your test results, renew your prescriptions, schedule appointments and more. To sign up, go to [email or web address] . Click on \"Log in\" on the left side of the screen, which will take you to the Welcome page. Then click on \"Sign up Now\" on the right side of the page.     You will be asked to enter the access code listed below, as well as some personal information. Please follow the directions to create your username and password.     Your access code is: [email or web address]  Expires: 2018 11:41 PM     Your access code will  in 90 days. If you need help or a new code, please call your [facility] clinic or [phone].        Care EveryWhere ID     This is " your Care EveryWhere ID. This could be used by other organizations to access your [facility] medical records  [id number]        Equal Access to Services     SEBLE BRADY : Hadii jada Pena, tristian keane, ena glass, roger arteaga. So [facility] [phone].    ATENCIÓN: Si habla español, tiene a newberry disposición servicios gratuitos de asistencia lingüística. Llame al [phone].    We comply with applicable federal civil rights laws and [place] laws. We do not discriminate on the basis of race, color, national origin, age, disability, sex, sexual orientation, or gender identity.            After Visit Summary       This is your record. Keep this with you and show to your community pharmacist(s) and doctor(s) at your next visit.

## 2018-06-29 NOTE — ED TRIAGE NOTES
ABC's intact.  Alert and oriented x4.    Pt states he started to notice some hematuria about 1-2 weeks ago.  Today noticed a blood clot.  Instructed to come in to ED in part due to hx of blood thinners.

## 2018-06-29 NOTE — DISCHARGE INSTRUCTIONS
Blood in the Urine    Blood in the urine (hematuria) has many possible causes. If it occurs after an injury (such as a car accident or fall), it is most often a sign of bruising to the kidney or bladder. Common causes of blood in the urine include urinary tract infections, kidney stones, inflammation, tumors, or certain other diseases of the kidney or bladder. Menstruation can cause blood to appear in the urine sample, although it is not coming from the urinary tract.  If only a trace amount of blood is present, it will show up on the urine test, even though the urine may be yellow and not pink or red. This may occur with any of the above conditions, as well as heavy exercise or high fever. In this case, your doctor may want to repeat the urine test on another day. This will show if the blood is still present. If it is, then other tests can be done to find out the cause.  Home care  Follow these home care guidelines:    If your urine does not appear bloody (pink, brown or red) then you do not need to restrict your activity in any way.    If you can see blood in your urine, rest and avoid heavy exertion until your next exam. Do not use aspirin, blood thinners, or anti-platelet or anti-inflammatory medicines. These include ibuprofen and naproxen. These thin the blood and may increase bleeding.  Follow-up care  Follow up with your healthcare provider, or as advised. If you were injured and had blood in your urine, you should have a repeat urine test in 1 to 2 days. Contact your doctor for this test.  A radiologist will review any X-rays that were taken. You will be told of any new findings that may affect your care.  When to seek medical advice  Call your healthcare provider right away if any of these occur:    Bright red blood or blood clots in the urine (if you did not have this before)    Weakness, dizziness or fainting    New groin, abdominal, or back pain    Fever of 100.4 F (38 C) or higher, or as directed by  your healthcare provider    Repeated vomiting    Bleeding from the nose or gums or easy bruising  Date Last Reviewed: 9/1/2016 2000-2017 [employer]. [address] [place] [address], [place] [address]. All rights reserved. This information is not intended as a substitute for professional medical care. Always follow your healthcare professional's instructions.          Hematuria: Possible Causes     Many things can lead to blood in the urine (hematuria). The blood may be seen with the eye (macroscopic or gross hematuria). Or it may only be seen when the urine is looked at under a microscope (microscopic hematuria). Some of the most common causes of blood in the urine are listed below. Often, no cause for the blood can be found. This is called idiopathic hematuria.    Kidney or bladder stones are collections of crystals. They form in the urine. Stones may be found anywhere in the urinary tract. But they form most often in the kidneys or bladder. In addition to blood in the urine, they can cause severe pain.    BPH stands for benign prostatic hyperplasia. It is enlargement of the prostate gland. It happens as men age. BPH often causes problems with urination. It sometimes causes blood in the urine.    A urinary tract infection (UTI) is due to bacteria growing in the urinary tract. It can cause blood in the urine. Other symptoms include burning or pain with urination. You may need to urinate often or urgently. You may also have a fever.    Damage to the urinary tract may cause blood in the urine. This damage may be due to a blow or accident. It may also result from the use of a urinary catheter. Very hard exercise may sometimes irritate the urinary tract and cause bleeding.    Cancer may occur anywhere in the urinary tract. A tumor may sometimes cause no symptoms other than bleeding.     Other possible causes of bleeding include:    Prostatitis (infection of the prostate gland)    Taking  anticoagulants    Blockage in the urinary tract    Disease or inflammation of the kidney    Cystic diseases of the kidneys    Sickle cell anemia    Vigorous exercise    Endometriosis  Date Last Reviewed: 12/1/2016 2000-2017 [employer]. [address] [place] [address], [place] [address]. All rights reserved. This information is not intended as a substitute for professional medical care. Always follow your healthcare professional's instructions.

## 2018-06-29 NOTE — ED PROVIDER NOTES
History     Chief Complaint:  Hematuria    HPI   Neo Marcos is a 86 year old male who presents to the emergency department today for evaluation of hematuria. The patient reports that for the past few weeks he has been having pink urine and today it was more red and he passed a small clot.  Here in the ED, the patient reports when he went to the bathroom his urine was clear. He denies any fever, pain, or trouble emptying his bladder. Is on xarelto.     Allergies:  No Known Drug Allergies     Medications:    Amlodipine  Atenolol  Lipitor  Finasteride  Lisinopril  Xarelto     Past Medical History:    Atrial fibrillation  Hypertension  Nonsenile cataract    Past Surgical History:    ENT surgery  Eye surgery  Hernia repair  Orthopedic surgery    Family History:    History reviewed. No pertinent family history.     Social History:  Smoking Status: Former Smoker  Alcohol Use: Positive  Marital Status:      Review of Systems   Constitutional: Negative for fever.   Genitourinary: Positive for hematuria.   All other systems reviewed and are negative.      Physical Exam     Patient Vitals for the past 24 hrs:   BP Temp Temp src Pulse SpO2   06/28/18 2224 185/83 97  F (36.1  C) Temporal 93 98 %         Physical Exam  General: Patient is alert and interactive when I enter the room  Head:  The scalp, face, and head appear normal  Eyes:  Conjunctivae are normal  ENT:    The nose is normal    Pinnae are normal    External acoustic canals are normal  Neck:  Trachea midline  CV:  Pulses are normal    Resp:  No respiratory distress   Abdomen:      Soft, non-tender, non-distended  Musc:  Normal muscular tone    No major joint effusions  Skin:  No rash or lesions noted  Neuro:  Speech is normal and fluent. Face is symmetric.     Moving all extremities well.   Psych: Awake. Alert.  Normal affect.  Appropriate interactions.      Emergency Department Course     Laboratory:  Laboratory findings were communicated with the  patient who voiced understanding of the findings.    CBC: WBC 6.4, HGB 14.3,   BMP:  (L) o/w WNL (Creatinine 0.70)    UA: Specific Gravity 1.001 (L), Blood Large (A), Protein Albumin 100 (A), Leukocyte Esterase Moderate (A), WBC/HPF 18 (H), RBC/HPF 29 (H), Bacteria Few (A)  Urine Culture: Pending     Emergency Department Course:    2254 Nursing notes and vitals reviewed.    2259 I performed an exam of the patient as documented above.     2311 IV was inserted and blood was drawn for laboratory testing, results above.    2334 I personally reviewed the laboratory results with the patient and answered all related questions prior to discharge.    Impression & Plan      Medical Decision Making:  Neo Marcos is a 86 year old male who presents to the emergency department today for evaluation of grossly bloody urine.  He has had these symptoms for the past few weeks, which worsened today.  There is no pain with these symptoms and he has not had these symptoms before.  he has no history of bladder cancer, surgery, or other urologic issues in the past and no history of strictures. Workup here is significant for possible hemorrhagic cystitis.  No signs of urinary retention. Blood work is unremarkable. No pain to suggest kidney stone. Will start on antibiotics and follow up with urology. I think he can continue the xarelto as he is not having any urinary retention and Hgb normal. Due to negative work-up will need follow up with urology as soon as possible for further work up and return to the ED with any worsening symptoms.    Diagnosis:    ICD-10-CM    1. Gross hematuria R31.0      Disposition:   The patient is discharged to home.    Discharge Medications:  Discharge Medication List as of 6/28/2018 11:41 PM      START taking these medications    Details   cephALEXin (KEFLEX) 500 MG capsule Take 1 capsule (500 mg) by mouth 4 times daily for 5 days, Disp-20 capsule, R-0, Local Print           Scribe  Disclosure:  I, Zamzam Delicia, am serving as a scribe at 11:00 PM on 6/28/2018 to document services personally performed by Yelitza Bundy MD based on my observations and the provider's statements to me.    [facility] EMERGENCY DEPARTMENT       Yelitza Bundy MD  06/29/18 1913       Yelitza Bundy MD  06/29/18 1914

## 2018-07-18 ENCOUNTER — HOSPITAL ENCOUNTER (EMERGENCY)
Facility: CLINIC | Age: 83
Discharge: HOME | End: 2018-07-18
Attending: EMERGENCY MEDICINE | Admitting: EMERGENCY MEDICINE
Payer: MEDICARE

## 2018-07-18 VITALS
OXYGEN SATURATION: 95 % | TEMPERATURE: 97.5 F | SYSTOLIC BLOOD PRESSURE: 152 MMHG | HEART RATE: 70 BPM | DIASTOLIC BLOOD PRESSURE: 72 MMHG | RESPIRATION RATE: 20 BRPM | WEIGHT: 138.45 LBS | BODY MASS INDEX: 22.35 KG/M2

## 2018-07-18 DIAGNOSIS — R31.0 GROSS HEMATURIA: ICD-10-CM

## 2018-07-18 LAB
ALBUMIN UR-MCNC: 100 MG/DL
ANION GAP SERPL CALCULATED.3IONS-SCNC: 13 MMOL/L (ref 3–14)
APPEARANCE UR: CLEAR
BACTERIA #/AREA URNS HPF: ABNORMAL /HPF
BASOPHILS # BLD AUTO: 0.1 10E9/L (ref 0–0.2)
BASOPHILS NFR BLD AUTO: 1.1 %
BILIRUB UR QL STRIP: NEGATIVE
BUN SERPL-MCNC: 21 MG/DL (ref 7–30)
CALCIUM SERPL-MCNC: 8.5 MG/DL (ref 8.5–10.1)
CHLORIDE SERPL-SCNC: 106 MMOL/L (ref 94–109)
CO2 SERPL-SCNC: 19 MMOL/L (ref 20–32)
COLOR UR AUTO: YELLOW
CREAT SERPL-MCNC: 0.79 MG/DL (ref 0.66–1.25)
DIFFERENTIAL METHOD BLD: ABNORMAL
EOSINOPHIL # BLD AUTO: 0.1 10E9/L (ref 0–0.7)
EOSINOPHIL NFR BLD AUTO: 1.1 %
ERYTHROCYTE [DISTWIDTH] IN BLOOD BY AUTOMATED COUNT: 16.7 % (ref 10–15)
GFR SERPL CREATININE-BSD FRML MDRD: >90 ML/MIN/1.7M2
GLUCOSE SERPL-MCNC: 70 MG/DL (ref 70–99)
GLUCOSE UR STRIP-MCNC: NEGATIVE MG/DL
HCT VFR BLD AUTO: 45.1 % (ref 40–53)
HGB BLD-MCNC: 15.2 G/DL (ref 13.3–17.7)
HGB UR QL STRIP: ABNORMAL
HYALINE CASTS #/AREA URNS LPF: 13 /LPF (ref 0–2)
IMM GRANULOCYTES # BLD: 0 10E9/L (ref 0–0.4)
IMM GRANULOCYTES NFR BLD: 0.6 %
KETONES UR STRIP-MCNC: 5 MG/DL
LEUKOCYTE ESTERASE UR QL STRIP: NEGATIVE
LYMPHOCYTES # BLD AUTO: 0.9 10E9/L (ref 0.8–5.3)
LYMPHOCYTES NFR BLD AUTO: 15.9 %
MCH RBC QN AUTO: 30.2 PG (ref 26.5–33)
MCHC RBC AUTO-ENTMCNC: 33.7 G/DL (ref 31.5–36.5)
MCV RBC AUTO: 90 FL (ref 78–100)
MONOCYTES # BLD AUTO: 0.7 10E9/L (ref 0–1.3)
MONOCYTES NFR BLD AUTO: 12.4 %
MUCOUS THREADS #/AREA URNS LPF: PRESENT /LPF
NEUTROPHILS # BLD AUTO: 3.7 10E9/L (ref 1.6–8.3)
NEUTROPHILS NFR BLD AUTO: 68.9 %
NITRATE UR QL: NEGATIVE
NRBC # BLD AUTO: 0 10*3/UL
NRBC BLD AUTO-RTO: 0 /100
PH UR STRIP: 6 PH (ref 5–7)
PLATELET # BLD AUTO: 207 10E9/L (ref 150–450)
POTASSIUM SERPL-SCNC: 4.1 MMOL/L (ref 3.4–5.3)
RBC # BLD AUTO: 5.04 10E12/L (ref 4.4–5.9)
RBC #/AREA URNS AUTO: >182 /HPF (ref 0–2)
SODIUM SERPL-SCNC: 138 MMOL/L (ref 133–144)
SOURCE: ABNORMAL
SP GR UR STRIP: 1.01 (ref 1–1.03)
SQUAMOUS #/AREA URNS AUTO: 1 /HPF (ref 0–1)
UROBILINOGEN UR STRIP-MCNC: 0 MG/DL (ref 0–2)
WBC # BLD AUTO: 5.4 10E9/L (ref 4–11)
WBC #/AREA URNS AUTO: 19 /HPF (ref 0–5)

## 2018-07-18 PROCEDURE — 85025 COMPLETE CBC W/AUTO DIFF WBC: CPT | Performed by: EMERGENCY MEDICINE

## 2018-07-18 PROCEDURE — 81001 URINALYSIS AUTO W/SCOPE: CPT | Performed by: EMERGENCY MEDICINE

## 2018-07-18 PROCEDURE — 87086 URINE CULTURE/COLONY COUNT: CPT | Performed by: EMERGENCY MEDICINE

## 2018-07-18 PROCEDURE — 80048 BASIC METABOLIC PNL TOTAL CA: CPT | Performed by: EMERGENCY MEDICINE

## 2018-07-18 PROCEDURE — 99283 EMERGENCY DEPT VISIT LOW MDM: CPT

## 2018-07-18 ASSESSMENT — ENCOUNTER SYMPTOMS
FREQUENCY: 0
FEVER: 0
CONSTIPATION: 0
DYSURIA: 0
HEMATURIA: 1

## 2018-07-18 NOTE — ED AVS SNAPSHOT
[facility] Emergency Department    [address]    [facility] [address]    Phone:  [phone]    Fax:  [phone]                                       Neo Marcos   MRN: [id number]    Department:  [facility] Emergency Department   Date of Visit:  7/18/2018           After Visit Summary Signature Page     I have received my discharge instructions, and my questions have been answered. I have discussed any challenges I see with this plan with the nurse or doctor.    ..........................................................................................................................................  Patient/Patient Representative Signature      ..........................................................................................................................................  Patient Representative Print Name and Relationship to Patient    ..................................................               ................................................  Date                                            Time    ..........................................................................................................................................  Reviewed by Signature/Title    ...................................................              ..............................................  Date                                                            Time

## 2018-07-18 NOTE — ED AVS SNAPSHOT
[facility] Emergency Department    [address]    [facility] [address]    Phone:  [phone]    Fax:  [phone]                                       Neo Marcos   MRN: [id number]    Department:  [facility] Emergency Department   Date of Visit:  7/18/2018           Patient Information     Date Of Birth          9/5/1931        Your diagnoses for this visit were:     Gross hematuria        You were seen by Farheen Del Valle MD.      Follow-up Information     Follow up with [facility]. Call today.    Specialty:  Urology    Why:  See if you can get earlier appointment. If not, keep appointment for next week.    Contact information:    [facility]  [address] [facility] [address] [phone]          Follow up with [facility], [place].    Specialty:  Family Practice    Why:  As needed    Contact information:    [address] [place] [address] [phone]          Follow up with [facility] Emergency Department.    Specialty:  EMERGENCY MEDICINE    Why:  If symptoms worsen    Contact information:    [address]  [facility] [address] [phone]        Discharge Instructions       Follow up ASAP with urology.   Return to ER if you cannot void urine, fever >100.4F, abdominal or flank pain, vomiting, lightheadedness, chest pain, shortness of breath, fatigue, or ANY OTHER concerns.  You will be called with positive urine culture results only.    Discharge References/Attachments     HEMATURIA: POSSIBLE CAUSES (ENGLISH)    HEMATURIA (ENGLISH)      24 Hour Appointment Hotline       To make an appointment at any [facility] clinic, call [phone] ([phone]). If you don't have a family doctor or clinic, we will help you find one. [facility] clinics are conveniently located to serve the needs of you and your family.             Review of your medicines      Our records show that you  are taking the medicines listed below. If these are incorrect, please call your family doctor or clinic.        Dose / Directions Last dose taken    amLODIPine 10 MG tablet   Commonly known as:  NORVASC   Dose:  10 mg        Take 10 mg by mouth daily   Refills:  0        atenolol 25 MG tablet   Commonly known as:  TENORMIN   Dose:  25 mg        Take 25 mg by mouth daily   Refills:  0        atorvastatin 40 MG tablet   Commonly known as:  LIPITOR   Dose:  40 mg        Take 40 mg by mouth daily   Refills:  0        CALCIUM 600 + D 600-200 MG-UNIT Tabs   Dose:  1 tablet   Generic drug:  Calcium Carb-Cholecalciferol        Take 1 tablet by mouth daily   Refills:  0        finasteride 5 MG tablet   Commonly known as:  PROSCAR   Dose:  5 mg        Take 5 mg by mouth daily   Refills:  0        fish oil-omega-3 fatty acids 1000 MG capsule   Dose:  1 g        Take 1 g by mouth daily   Refills:  0        lisinopril 20 MG tablet   Commonly known as:  PRINIVIL/ZESTRIL   Dose:  20 mg        Take 20 mg by mouth daily   Refills:  0        * multivitamin, therapeutic with minerals Tabs tablet   Dose:  1 tablet        Take 1 tablet by mouth daily   Refills:  0        * PRESERVISION AREDS 2 PO   Dose:  1 capsule        Take 1 capsule by mouth 2 times daily   Refills:  0        rivaroxaban ANTICOAGULANT 15 MG Tabs tablet   Commonly known as:  XARELTO   Dose:  15 mg        Take 1 tablet (15 mg) by mouth daily (with dinner)   Refills:  0        Vitamin D (Cholecalciferol) 1000 units Tabs   Dose:  1000 Units        Take 1,000 Units by mouth daily   Refills:  0        * Notice:  This list has 2 medication(s) that are the same as other medications prescribed for you. Read the directions carefully, and ask your doctor or other care provider to review them with you.            Procedures and tests performed during your visit     Basic metabolic panel    CBC with platelets differential    UA with Microscopic    Urine Culture      Orders  Needing Specimen Collection     None      Pending Results     Date and Time Order Name Status Description    7/18/2018 0644 Urine Culture In process             Pending Culture Results     Date and Time Order Name Status Description    7/18/2018 0644 Urine Culture In process             Pending Results Instructions     If you had any lab results that were not finalized at the time of your Discharge, you can call the ED Lab Result RN at [phone]. You will be contacted by this team for any positive Lab results or changes in treatment. The nurses are available 7 days a week from 10A to 6:30P.  You can leave a message 24 hours per day and they will return your call.        Test Results From Your Hospital Stay        7/18/2018  7:03 AM      Component Results     Component Value Ref Range & Units Status    Color Urine Yellow  Final    Appearance Urine Clear  Final    Glucose Urine Negative NEG^Negative mg/dL Final    Bilirubin Urine Negative NEG^Negative Final    Ketones Urine 5 (A) NEG^Negative mg/dL Final    Specific Gravity Urine 1.009 1.003 - 1.035 Final    Blood Urine Large (A) NEG^Negative Final    pH Urine 6.0 5.0 - 7.0 pH Final    Protein Albumin Urine 100 (A) NEG^Negative mg/dL Final    Urobilinogen mg/dL 0.0 0.0 - 2.0 mg/dL Final    Nitrite Urine Negative NEG^Negative Final    Leukocyte Esterase Urine Negative NEG^Negative Final    Source Midstream Urine  Final    WBC Urine 19 (H) 0 - 5 /HPF Final    RBC Urine >182 (H) 0 - 2 /HPF Final    Bacteria Urine Few (A) NEG^Negative /HPF Final    Squamous Epithelial /HPF Urine 1 0 - 1 /HPF Final    Mucous Urine Present (A) NEG^Negative /LPF Final    Hyaline Casts 13 (H) 0 - 2 /LPF Final         7/18/2018  7:15 AM      Component Results     Component Value Ref Range & Units Status    WBC 5.4 4.0 - 11.0 10e9/L Final    RBC Count 5.04 4.4 - 5.9 10e12/L Final    Hemoglobin 15.2 13.3 - 17.7 g/dL Final    Hematocrit 45.1 40.0 - 53.0 % Final    MCV 90 78 - 100 fl Final     MCH 30.2 26.5 - 33.0 pg Final    MCHC 33.7 31.5 - 36.5 g/dL Final    RDW 16.7 (H) 10.0 - 15.0 % Final    Platelet Count 207 150 - 450 10e9/L Final    Diff Method Automated Method  Final    % Neutrophils 68.9 % Final    % Lymphocytes 15.9 % Final    % Monocytes 12.4 % Final    % Eosinophils 1.1 % Final    % Basophils 1.1 % Final    % Immature Granulocytes 0.6 % Final    Nucleated RBCs 0 0 /100 Final    Absolute Neutrophil 3.7 1.6 - 8.3 10e9/L Final    Absolute Lymphocytes 0.9 0.8 - 5.3 10e9/L Final    Absolute Monocytes 0.7 0.0 - 1.3 10e9/L Final    Absolute Eosinophils 0.1 0.0 - 0.7 10e9/L Final    Absolute Basophils 0.1 0.0 - 0.2 10e9/L Final    Abs Immature Granulocytes 0.0 0 - 0.4 10e9/L Final    Absolute Nucleated RBC 0.0  Final         7/18/2018  7:39 AM      Component Results     Component Value Ref Range & Units Status    Sodium 138 133 - 144 mmol/L Final    Potassium 4.1 3.4 - 5.3 mmol/L Final    Chloride 106 94 - 109 mmol/L Final    Carbon Dioxide 19 (L) 20 - 32 mmol/L Final    Anion Gap 13 3 - 14 mmol/L Final    Glucose 70 70 - 99 mg/dL Final    Urea Nitrogen 21 7 - 30 mg/dL Final    Creatinine 0.79 0.66 - 1.25 mg/dL Final    GFR Estimate >90 >60 mL/min/1.7m2 Final    Non  GFR Calc    GFR Estimate If Black >90 >60 mL/min/1.7m2 Final    African American GFR Calc    Calcium 8.5 8.5 - 10.1 mg/dL Final         7/18/2018  6:46 AM                Clinical Quality Measure: Blood Pressure Screening     Your blood pressure was checked while you were in the emergency department today. The last reading we obtained was  BP: 160/75 . Please read the guidelines below about what these numbers mean and what you should do about them.  If your systolic blood pressure (the top number) is less than 120 and your diastolic blood pressure (the bottom number) is less than 80, then your blood pressure is normal. There is nothing more that you need to do about it.  If your systolic blood pressure (the top number) is  "120-139 or your diastolic blood pressure (the bottom number) is 80-89, your blood pressure may be higher than it should be. You should have your blood pressure rechecked within a year by a primary care provider.  If your systolic blood pressure (the top number) is 140 or greater or your diastolic blood pressure (the bottom number) is 90 or greater, you may have high blood pressure. High blood pressure is treatable, but if left untreated over time it can put you at risk for heart attack, stroke, or kidney failure. You should have your blood pressure rechecked by a primary care provider within the next 4 weeks.  If your provider in the emergency department today gave you specific instructions to follow-up with your doctor or provider even sooner than that, you should follow that instruction and not wait for up to 4 weeks for your follow-up visit.        Thank you for choosing [facility]       Thank you for choosing [facility] for your care. Our goal is always to provide you with excellent care. Hearing back from our patients is one way we can continue to improve our services. Please take a few minutes to complete the written survey that you may receive in the mail after you visit with us. Thank you!        [employer] Information     [employer] lets you send messages to your doctor, view your test results, renew your prescriptions, schedule appointments and more. To sign up, go to [email or web address] . Click on \"Log in\" on the left side of the screen, which will take you to the Welcome page. Then click on \"Sign up Now\" on the right side of the page.     You will be asked to enter the access code listed below, as well as some personal information. Please follow the directions to create your username and password.     Your access code is: [email or web address]  Expires: 2018 11:41 PM     Your access code will  in 90 days. If you need help or a new code, please call your [facility] clinic or [phone].        Care EveryWhere " ID     This is your Care EveryWhere ID. This could be used by other organizations to access your [facility] medical records  [id number]        Equal Access to Services     SEBLE BRADY : Roscoe Pena, tristian keane, roger mullins. So [facility] [phone].    ATENCIÓN: Si habla español, tiene a newberry disposición servicios gratuitos de asistencia lingüística. Llame al [phone].    We comply with applicable federal civil rights laws and [place] laws. We do not discriminate on the basis of race, color, national origin, age, disability, sex, sexual orientation, or gender identity.            After Visit Summary       This is your record. Keep this with you and show to your community pharmacist(s) and doctor(s) at your next visit.

## 2018-07-18 NOTE — ED TRIAGE NOTES
Patient alert and oriented times 3 .  Abc intact had clear urin at 9pm now had bloody urine at 0530

## 2018-07-18 NOTE — DISCHARGE INSTRUCTIONS
Follow up ASAP with urology.   Return to ER if you cannot void urine, fever >100.4F, abdominal or flank pain, vomiting, lightheadedness, chest pain, shortness of breath, fatigue, or ANY OTHER concerns.  You will be called with positive urine culture results only.

## 2018-07-18 NOTE — ED PROVIDER NOTES
History     Chief Complaint:  Hematuria    The history is provided by the patient and a relative.      Neo Marcos is an 86 year old male with a history of HTN and atrial fibrillation on Xarelto who presents for evaluation of hematuria. The patient reports that he woke up this morning and urinated with painless hematuria. He also noted some clots with this episode. The patient was seen for gross hematuria in this ER about 3 weeks ago. He was treated with 5 days of antibiotics and states the urine did not clear while on the antibiotics, but did clear about 1 week ago (about 2 weeks after last visit). Last night, he had no gross hematuria. He has had no difficulty voiding, constipation, fever, dysuria, abdominal pain, or other concerns. Of note, he has an appointment set with his urologist next week.     Allergies:  NKDA    Medications:    Amlodipine  Atenolol  Lipitor  Proscar  Lisinopril  Xarelto    Past Medical History:    Atrial fibrillation  HTN  Cataracts    Past Surgical History:    ENT surgery  Eye surgery  Hernia repair  Orthopedic surgery    Family History:    No past pertinent family history.    Social History:  Presents with daughter  Former Smoker  Alcohol use: yes     Review of Systems   Constitutional: Negative for fever.   Gastrointestinal: Negative for abdominal pain and constipation.   Genitourinary: Positive for hematuria. Negative for difficulty urinating, dysuria, flank pain, frequency, penile pain and urgency.   All other systems reviewed and are negative.    Physical Exam     Patient Vitals for the past 24 hrs:   BP Temp Temp src Pulse Heart Rate Resp SpO2 Weight   07/18/18 0730 - - - - - - 96 % -   07/18/18 0715 - - - - - - 95 % -   07/18/18 0700 - - - - - - 96 % -   07/18/18 0630 - - - 70 70 - - -   07/18/18 0616 160/75 97.5  F (36.4  C) Oral - - 20 97 % 62.8 kg (138 lb 7.2 oz)      Physical Exam  General: Well-developed and well-nourished. Well appearing elderly  man.  Cooperative.  Head:  Atraumatic.  Eyes:  Conjunctivae, lids, and sclerae are normal.  ENT:    Normal nose. Moist mucous membranes.  Neck:  Supple. Normal range of motion.  CV:  Regular rate and rhythm. Normal heart sounds with no murmurs, rubs, or gallops detected.  Resp:  No respiratory distress. Clear to auscultation bilaterally without decreased breath sounds, wheezing, rales, or rhonchi.  GI:  Soft. Non-distended. Non-tender.   :  Normal external male genitalia, circumcised.  No blood or discharge at urethral meatus.  No testicular masses or tenderness.   MS:  Normal ROM.  Skin:  Warm. Non-diaphoretic. No pallor.  Neuro:  Awake. A&Ox3. Normal strength.  Psych: Normal mood and affect. Normal speech.  Vitals reviewed.    Emergency Department Course   Laboratory:  CBC: WBC: 5.4, HGB: 15.2, PLT: 207  BMP: Carbon dioxide 19 (L), o/w WNL (Creatinine: 0.79)    UA with Microscopic: blood large (A), protein albumin 100 (A), ketones 5 (A), WBC 19 (H), RBC >182 (H), bacteria few (A), mucous present (A), hyaline casts 13 (H), o/w WNL  Urine culture: pending    Emergency Department Course:  Nursing notes and vitals reviewed. (0635) I performed an exam of the patient as documented above.     Blood drawn. This was sent to the lab for further testing, results above.    The patient provided a urine sample here in the emergency department. This was sent for laboratory testing, findings above.     (0800) I rechecked the patient and discussed the results of his workup with him and his daughter.     Findings and plan explained to the patient. Patient discharged home with instructions regarding supportive care, medications, and reasons to return. The importance of close follow-up was reviewed.    I personally reviewed the laboratory results with the patient and answered all related questions prior to discharge.     Impression & Plan      Medical Decision Making:  Neo is an 86 year old man who presents with his daughter for  hematuria.  He was seen for similar complaints almost 1 month ago, and was treated for UTI.  Patient states he eventually did have resolution, but not immediately and not at completion of antibiotics.  He has had clear urine for about 1 week and is awaiting follow-up with urology next week.  However, today he voided gross hematuria with blood clots which was increased from his usual bloody appearing urine.  He otherwise has no concerns or complaints and states he is feeling well.  He is on Xarelto for a history of atrial fibrillation.  Exam is unremarkable.  He did void in the emergency department without difficulty with return of light red/brown or pink tea colored urine without clots.  He has no leukocytosis with a hemoglobin of 15.2.  He has grossly normal electrolytes and a creatinine of 0.79.  There is no evidence of systemic illness, anemia, or acute kidney injury.  Urinalysis reveals hematuria with greater than 182 red blood cells per high-powered field.  Although there are 19 white blood cells per HPF, I believe this is more representative of gross blood in the urine rather than true pyuria secondary to infection.  A few bacteria are noted.  His urinalysis at his prior visit had fewer RBCs with a similar number of white blood cells.  No urine culture was sent.  At this time, I do not believe the patient's hematuria is representative of an infection, particularly because he did not have improvement with antibiotics and he has no urinary symptoms including dysuria, frequency, urgency, or foul-smelling odor.  Likely, patient's hematuria is related to a different urinary tract pathology and exacerbated by his Xarelto use. He would most benefit from urology evaluation with possible cystoscopy.  He does have a history of kidney stones and may require CT imaging in the near future.  However, because his laboratory studies are reassuring and he has no symptoms, I believe this can safely be deferred at this time.  I  had a long discussion with the patient and his daughter about the urinalysis and my plan to defer antibiotics until the culture has resulted.  They understand they will be called only with positive results.  The daughter will call urology to try to get an appointment earlier than 1 week from now and in the interim I have provided very strict return precautions including those for urinary retention, pyelonephritis, and blood loss anemia.  All the patient and his daughter's questions were answered and they verbalized understanding.  Amenable to discharge.      Diagnosis:    ICD-10-CM   1. Gross hematuria R31.0     Disposition:  discharged home with his daughter    Scribe Disclosure:  I, Johnna Tucker, am serving as a scribe on 7/18/2018 at 6:27 AM to personally document services performed by Farheen Del Valle MD based on my observations and the provider's statements to me.     Johnna Ceballos  7/18/2018   [facility] EMERGENCY DEPARTMENT       Farheen Del Valle MD  07/19/18 9412

## 2018-07-19 LAB
BACTERIA SPEC CULT: NO GROWTH
Lab: NORMAL
SPECIMEN SOURCE: NORMAL

## 2018-07-19 ASSESSMENT — ENCOUNTER SYMPTOMS
ABDOMINAL PAIN: 0
DIFFICULTY URINATING: 0
FLANK PAIN: 0

## 2018-09-04 ENCOUNTER — HOSPITAL ENCOUNTER (OUTPATIENT)
Facility: CLINIC | Age: 83
Setting detail: OBSERVATION
Discharge: HOME | End: 2018-09-05
Attending: EMERGENCY MEDICINE | Admitting: HOSPITALIST
Payer: MEDICARE

## 2018-09-04 ENCOUNTER — APPOINTMENT (OUTPATIENT)
Dept: CT IMAGING | Facility: CLINIC | Age: 83
End: 2018-09-04
Attending: EMERGENCY MEDICINE
Payer: MEDICARE

## 2018-09-04 DIAGNOSIS — F10.939 ALCOHOL WITHDRAWAL SYNDROME WITH COMPLICATION (H): ICD-10-CM

## 2018-09-04 DIAGNOSIS — R79.89 TROPONIN LEVEL ELEVATED: Primary | ICD-10-CM

## 2018-09-04 DIAGNOSIS — R07.9 ACUTE CHEST PAIN: ICD-10-CM

## 2018-09-04 LAB
ALBUMIN UR-MCNC: 100 MG/DL
ANION GAP SERPL CALCULATED.3IONS-SCNC: 10 MMOL/L (ref 3–14)
APPEARANCE UR: CLEAR
BASOPHILS # BLD AUTO: 0 10E9/L (ref 0–0.2)
BASOPHILS NFR BLD AUTO: 0.3 %
BILIRUB UR QL STRIP: NEGATIVE
BUN SERPL-MCNC: 20 MG/DL (ref 7–30)
CALCIUM SERPL-MCNC: 8.5 MG/DL (ref 8.5–10.1)
CHLORIDE SERPL-SCNC: 105 MMOL/L (ref 94–109)
CO2 SERPL-SCNC: 26 MMOL/L (ref 20–32)
COLOR UR AUTO: YELLOW
CREAT SERPL-MCNC: 0.83 MG/DL (ref 0.66–1.25)
DIFFERENTIAL METHOD BLD: ABNORMAL
EOSINOPHIL # BLD AUTO: 0 10E9/L (ref 0–0.7)
EOSINOPHIL NFR BLD AUTO: 0 %
ERYTHROCYTE [DISTWIDTH] IN BLOOD BY AUTOMATED COUNT: 16.9 % (ref 10–15)
GFR SERPL CREATININE-BSD FRML MDRD: 87 ML/MIN/1.7M2
GLUCOSE SERPL-MCNC: 84 MG/DL (ref 70–99)
GLUCOSE UR STRIP-MCNC: NEGATIVE MG/DL
HCT VFR BLD AUTO: 42.5 % (ref 40–53)
HGB BLD-MCNC: 14.1 G/DL (ref 13.3–17.7)
HGB UR QL STRIP: NEGATIVE
HYALINE CASTS #/AREA URNS LPF: 1 /LPF (ref 0–2)
IMM GRANULOCYTES # BLD: 0 10E9/L (ref 0–0.4)
IMM GRANULOCYTES NFR BLD: 0.6 %
INTERPRETATION ECG - MUSE: NORMAL
KETONES UR STRIP-MCNC: 5 MG/DL
LEUKOCYTE ESTERASE UR QL STRIP: NEGATIVE
LYMPHOCYTES # BLD AUTO: 0.5 10E9/L (ref 0.8–5.3)
LYMPHOCYTES NFR BLD AUTO: 7.1 %
MAGNESIUM SERPL-MCNC: 2.4 MG/DL (ref 1.6–2.3)
MCH RBC QN AUTO: 31.1 PG (ref 26.5–33)
MCHC RBC AUTO-ENTMCNC: 33.2 G/DL (ref 31.5–36.5)
MCV RBC AUTO: 94 FL (ref 78–100)
MONOCYTES # BLD AUTO: 0.8 10E9/L (ref 0–1.3)
MONOCYTES NFR BLD AUTO: 12.6 %
MUCOUS THREADS #/AREA URNS LPF: PRESENT /LPF
NEUTROPHILS # BLD AUTO: 5.1 10E9/L (ref 1.6–8.3)
NEUTROPHILS NFR BLD AUTO: 79.4 %
NITRATE UR QL: NEGATIVE
NRBC # BLD AUTO: 0 10*3/UL
NRBC BLD AUTO-RTO: 0 /100
PH UR STRIP: 6 PH (ref 5–7)
PLATELET # BLD AUTO: 212 10E9/L (ref 150–450)
POTASSIUM SERPL-SCNC: 4.3 MMOL/L (ref 3.4–5.3)
RBC # BLD AUTO: 4.53 10E12/L (ref 4.4–5.9)
RBC #/AREA URNS AUTO: 2 /HPF (ref 0–2)
SODIUM SERPL-SCNC: 141 MMOL/L (ref 133–144)
SOURCE: ABNORMAL
SP GR UR STRIP: 1.01 (ref 1–1.03)
SQUAMOUS #/AREA URNS AUTO: 1 /HPF (ref 0–1)
TROPONIN I SERPL-MCNC: 0.05 UG/L (ref 0–0.04)
TROPONIN I SERPL-MCNC: 0.06 UG/L (ref 0–0.04)
TROPONIN I SERPL-MCNC: 0.06 UG/L (ref 0–0.04)
UROBILINOGEN UR STRIP-MCNC: 0 MG/DL (ref 0–2)
WBC # BLD AUTO: 6.4 10E9/L (ref 4–11)
WBC #/AREA URNS AUTO: 1 /HPF (ref 0–5)

## 2018-09-04 PROCEDURE — 81001 URINALYSIS AUTO W/SCOPE: CPT | Performed by: EMERGENCY MEDICINE

## 2018-09-04 PROCEDURE — A9270 NON-COVERED ITEM OR SERVICE: HCPCS | Mod: GY | Performed by: HOSPITALIST

## 2018-09-04 PROCEDURE — 93005 ELECTROCARDIOGRAM TRACING: CPT

## 2018-09-04 PROCEDURE — 25000128 H RX IP 250 OP 636: Performed by: HOSPITALIST

## 2018-09-04 PROCEDURE — 85025 COMPLETE CBC W/AUTO DIFF WBC: CPT | Performed by: EMERGENCY MEDICINE

## 2018-09-04 PROCEDURE — 25000128 H RX IP 250 OP 636: Performed by: EMERGENCY MEDICINE

## 2018-09-04 PROCEDURE — G0378 HOSPITAL OBSERVATION PER HR: HCPCS

## 2018-09-04 PROCEDURE — 96361 HYDRATE IV INFUSION ADD-ON: CPT

## 2018-09-04 PROCEDURE — 36415 COLL VENOUS BLD VENIPUNCTURE: CPT | Performed by: HOSPITALIST

## 2018-09-04 PROCEDURE — 93005 ELECTROCARDIOGRAM TRACING: CPT | Mod: 76

## 2018-09-04 PROCEDURE — 84484 ASSAY OF TROPONIN QUANT: CPT | Performed by: HOSPITALIST

## 2018-09-04 PROCEDURE — 80048 BASIC METABOLIC PNL TOTAL CA: CPT | Performed by: EMERGENCY MEDICINE

## 2018-09-04 PROCEDURE — 99285 EMERGENCY DEPT VISIT HI MDM: CPT | Mod: 25

## 2018-09-04 PROCEDURE — 99220 ZZC INITIAL OBSERVATION CARE,LEVL III: CPT | Performed by: HOSPITALIST

## 2018-09-04 PROCEDURE — 96374 THER/PROPH/DIAG INJ IV PUSH: CPT

## 2018-09-04 PROCEDURE — 84484 ASSAY OF TROPONIN QUANT: CPT | Performed by: EMERGENCY MEDICINE

## 2018-09-04 PROCEDURE — 83735 ASSAY OF MAGNESIUM: CPT | Performed by: EMERGENCY MEDICINE

## 2018-09-04 PROCEDURE — 25000132 ZZH RX MED GY IP 250 OP 250 PS 637: Mod: GY | Performed by: HOSPITALIST

## 2018-09-04 PROCEDURE — 70450 CT HEAD/BRAIN W/O DYE: CPT

## 2018-09-04 RX ORDER — POTASSIUM CHLORIDE 1500 MG/1
20-40 TABLET, EXTENDED RELEASE ORAL
Status: DISCONTINUED | OUTPATIENT
Start: 2018-09-04 | End: 2018-09-05 | Stop reason: HOSPADM

## 2018-09-04 RX ORDER — AMLODIPINE BESYLATE 10 MG/1
10 TABLET ORAL DAILY
Status: DISCONTINUED | OUTPATIENT
Start: 2018-09-05 | End: 2018-09-05 | Stop reason: HOSPADM

## 2018-09-04 RX ORDER — ACETAMINOPHEN 325 MG/1
650 TABLET ORAL EVERY 4 HOURS PRN
Status: DISCONTINUED | OUTPATIENT
Start: 2018-09-04 | End: 2018-09-05 | Stop reason: HOSPADM

## 2018-09-04 RX ORDER — MULTIPLE VITAMINS W/ MINERALS TAB 9MG-400MCG
1 TAB ORAL DAILY
Status: DISCONTINUED | OUTPATIENT
Start: 2018-09-04 | End: 2018-09-05 | Stop reason: HOSPADM

## 2018-09-04 RX ORDER — LIDOCAINE 40 MG/G
CREAM TOPICAL
Status: DISCONTINUED | OUTPATIENT
Start: 2018-09-04 | End: 2018-09-05 | Stop reason: HOSPADM

## 2018-09-04 RX ORDER — PROCHLORPERAZINE MALEATE 5 MG
5 TABLET ORAL EVERY 6 HOURS PRN
Status: DISCONTINUED | OUTPATIENT
Start: 2018-09-04 | End: 2018-09-05 | Stop reason: HOSPADM

## 2018-09-04 RX ORDER — ONDANSETRON 2 MG/ML
4 INJECTION INTRAMUSCULAR; INTRAVENOUS EVERY 6 HOURS PRN
Status: DISCONTINUED | OUTPATIENT
Start: 2018-09-04 | End: 2018-09-05 | Stop reason: HOSPADM

## 2018-09-04 RX ORDER — MAGNESIUM SULFATE HEPTAHYDRATE 40 MG/ML
4 INJECTION, SOLUTION INTRAVENOUS EVERY 4 HOURS PRN
Status: DISCONTINUED | OUTPATIENT
Start: 2018-09-04 | End: 2018-09-05 | Stop reason: HOSPADM

## 2018-09-04 RX ORDER — LANOLIN ALCOHOL/MO/W.PET/CERES
100 CREAM (GRAM) TOPICAL DAILY
Status: DISCONTINUED | OUTPATIENT
Start: 2018-09-04 | End: 2018-09-05 | Stop reason: HOSPADM

## 2018-09-04 RX ORDER — NALOXONE HYDROCHLORIDE 0.4 MG/ML
.1-.4 INJECTION, SOLUTION INTRAMUSCULAR; INTRAVENOUS; SUBCUTANEOUS
Status: DISCONTINUED | OUTPATIENT
Start: 2018-09-04 | End: 2018-09-05 | Stop reason: HOSPADM

## 2018-09-04 RX ORDER — SODIUM CHLORIDE 9 MG/ML
1000 INJECTION, SOLUTION INTRAVENOUS CONTINUOUS
Status: DISCONTINUED | OUTPATIENT
Start: 2018-09-04 | End: 2018-09-04

## 2018-09-04 RX ORDER — ONDANSETRON 4 MG/1
4 TABLET, ORALLY DISINTEGRATING ORAL EVERY 6 HOURS PRN
Status: DISCONTINUED | OUTPATIENT
Start: 2018-09-04 | End: 2018-09-05 | Stop reason: HOSPADM

## 2018-09-04 RX ORDER — POTASSIUM CHLORIDE 7.45 MG/ML
10 INJECTION INTRAVENOUS
Status: DISCONTINUED | OUTPATIENT
Start: 2018-09-04 | End: 2018-09-05 | Stop reason: HOSPADM

## 2018-09-04 RX ORDER — LORAZEPAM 2 MG/ML
1-2 INJECTION INTRAMUSCULAR EVERY 30 MIN PRN
Status: DISCONTINUED | OUTPATIENT
Start: 2018-09-04 | End: 2018-09-05 | Stop reason: HOSPADM

## 2018-09-04 RX ORDER — PROCHLORPERAZINE 25 MG
12.5 SUPPOSITORY, RECTAL RECTAL EVERY 12 HOURS PRN
Status: DISCONTINUED | OUTPATIENT
Start: 2018-09-04 | End: 2018-09-05 | Stop reason: HOSPADM

## 2018-09-04 RX ORDER — NITROGLYCERIN 0.4 MG/1
0.4 TABLET SUBLINGUAL EVERY 5 MIN PRN
Status: DISCONTINUED | OUTPATIENT
Start: 2018-09-04 | End: 2018-09-05 | Stop reason: HOSPADM

## 2018-09-04 RX ORDER — LORAZEPAM 1 MG/1
1-2 TABLET ORAL EVERY 30 MIN PRN
Status: DISCONTINUED | OUTPATIENT
Start: 2018-09-04 | End: 2018-09-05 | Stop reason: HOSPADM

## 2018-09-04 RX ORDER — METOPROLOL SUCCINATE 50 MG/1
50 TABLET, EXTENDED RELEASE ORAL DAILY
COMMUNITY
End: 2020-01-01

## 2018-09-04 RX ORDER — POTASSIUM CHLORIDE 29.8 MG/ML
20 INJECTION INTRAVENOUS
Status: DISCONTINUED | OUTPATIENT
Start: 2018-09-04 | End: 2018-09-05 | Stop reason: HOSPADM

## 2018-09-04 RX ORDER — ACETAMINOPHEN 650 MG/1
650 SUPPOSITORY RECTAL EVERY 4 HOURS PRN
Status: DISCONTINUED | OUTPATIENT
Start: 2018-09-04 | End: 2018-09-05 | Stop reason: HOSPADM

## 2018-09-04 RX ORDER — POLYETHYLENE GLYCOL 3350 17 G/17G
17 POWDER, FOR SOLUTION ORAL DAILY PRN
Status: DISCONTINUED | OUTPATIENT
Start: 2018-09-04 | End: 2018-09-05 | Stop reason: HOSPADM

## 2018-09-04 RX ORDER — ATENOLOL 25 MG/1
25 TABLET ORAL DAILY
Status: DISCONTINUED | OUTPATIENT
Start: 2018-09-05 | End: 2018-09-05

## 2018-09-04 RX ORDER — POTASSIUM CL/LIDO/0.9 % NACL 10MEQ/0.1L
10 INTRAVENOUS SOLUTION, PIGGYBACK (ML) INTRAVENOUS
Status: DISCONTINUED | OUTPATIENT
Start: 2018-09-04 | End: 2018-09-05 | Stop reason: HOSPADM

## 2018-09-04 RX ORDER — FOLIC ACID 1 MG/1
1 TABLET ORAL DAILY
Status: DISCONTINUED | OUTPATIENT
Start: 2018-09-04 | End: 2018-09-05 | Stop reason: HOSPADM

## 2018-09-04 RX ORDER — SODIUM CHLORIDE, SODIUM LACTATE, POTASSIUM CHLORIDE, CALCIUM CHLORIDE 600; 310; 30; 20 MG/100ML; MG/100ML; MG/100ML; MG/100ML
INJECTION, SOLUTION INTRAVENOUS CONTINUOUS
Status: DISCONTINUED | OUTPATIENT
Start: 2018-09-04 | End: 2018-09-05

## 2018-09-04 RX ORDER — BISACODYL 10 MG
10 SUPPOSITORY, RECTAL RECTAL DAILY PRN
Status: DISCONTINUED | OUTPATIENT
Start: 2018-09-04 | End: 2018-09-05 | Stop reason: HOSPADM

## 2018-09-04 RX ORDER — FINASTERIDE 5 MG/1
5 TABLET, FILM COATED ORAL DAILY
Status: DISCONTINUED | OUTPATIENT
Start: 2018-09-05 | End: 2018-09-05 | Stop reason: HOSPADM

## 2018-09-04 RX ORDER — LORAZEPAM 2 MG/ML
0.2 INJECTION INTRAMUSCULAR ONCE
Status: COMPLETED | OUTPATIENT
Start: 2018-09-04 | End: 2018-09-04

## 2018-09-04 RX ORDER — LISINOPRIL 20 MG/1
20 TABLET ORAL DAILY
Status: DISCONTINUED | OUTPATIENT
Start: 2018-09-05 | End: 2018-09-05 | Stop reason: HOSPADM

## 2018-09-04 RX ORDER — POTASSIUM CHLORIDE 1.5 G/1.58G
20-40 POWDER, FOR SOLUTION ORAL
Status: DISCONTINUED | OUTPATIENT
Start: 2018-09-04 | End: 2018-09-05 | Stop reason: HOSPADM

## 2018-09-04 RX ADMIN — RIVAROXABAN 15 MG: 15 TABLET, FILM COATED ORAL at 18:18

## 2018-09-04 RX ADMIN — MULTIPLE VITAMINS W/ MINERALS TAB 1 TABLET: TAB at 18:18

## 2018-09-04 RX ADMIN — SODIUM CHLORIDE 1000 ML: 9 INJECTION, SOLUTION INTRAVENOUS at 14:15

## 2018-09-04 RX ADMIN — FOLIC ACID 1 MG: 1 TABLET ORAL at 18:18

## 2018-09-04 RX ADMIN — LORAZEPAM 0.2 MG: 2 INJECTION INTRAMUSCULAR; INTRAVENOUS at 14:17

## 2018-09-04 RX ADMIN — Medication 100 MG: at 18:18

## 2018-09-04 RX ADMIN — SODIUM CHLORIDE, POTASSIUM CHLORIDE, SODIUM LACTATE AND CALCIUM CHLORIDE: 600; 310; 30; 20 INJECTION, SOLUTION INTRAVENOUS at 17:50

## 2018-09-04 ASSESSMENT — ACTIVITIES OF DAILY LIVING (ADL)
BATHING: 2 - ASSISTIVE PERSON
TRANSFERRING: 2 - ASSISTIVE PERSON
COMMUNICATION: 0 - UNDERSTANDS/COMMUNICATES WITHOUT DIFFICULTY
COGNITION: 0 - NO COGNITION ISSUES REPORTED
RETIRED_COMMUNICATION: 0-->UNDERSTANDS/COMMUNICATES WITHOUT DIFFICULTY
TOILETING: 2 - ASSISTIVE PERSON
EATING: 0 - INDEPENDENT
AMBULATION: 2 - ASSISTIVE PERSON
SWALLOWING: 0-->SWALLOWS FOODS/LIQUIDS WITHOUT DIFFICULTY
RETIRED_EATING: 0-->INDEPENDENT
DRESS: 2 - ASSISTIVE PERSON
AMBULATION: 0-->INDEPENDENT
NUMBER_OF_TIMES_PATIENT_HAS_FALLEN_WITHIN_LAST_SIX_MONTHS: 5
TOILETING: 0-->INDEPENDENT
DRESS: 0-->INDEPENDENT
TRANSFERRING: 0-->INDEPENDENT
CHANGE_IN_FUNCTIONAL_STATUS_SINCE_ONSET_OF_CURRENT_ILLNESS/INJURY: YES
SWALLOWING: 0 - SWALLOWS FOODS/LIQUIDS WITHOUT DIFFICULTY
BATHING: 0-->INDEPENDENT
WHICH_OF_THE_ABOVE_FUNCTIONAL_RISKS_HAD_A_RECENT_ONSET_OR_CHANGE?: AMBULATION;FALL HISTORY
FALL_HISTORY_WITHIN_LAST_SIX_MONTHS: YES

## 2018-09-04 ASSESSMENT — ENCOUNTER SYMPTOMS
VOMITING: 0
NAUSEA: 1
DIZZINESS: 1
WEAKNESS: 1

## 2018-09-04 NOTE — IP AVS SNAPSHOT
[facility]    [address]    [facility] [address]    Phone:  [phone]    Fax:  [phone]                                       After Visit Summary   9/4/2018    Neo Marcos    MRN: [id number]           After Visit Summary Signature Page     I have received my discharge instructions, and my questions have been answered. I have discussed any challenges I see with this plan with the nurse or doctor.    ..........................................................................................................................................  Patient/Patient Representative Signature      ..........................................................................................................................................  Patient Representative Print Name and Relationship to Patient    ..................................................               ................................................  Date                                            Time    ..........................................................................................................................................  Reviewed by Signature/Title    ...................................................              ..............................................  Date                                                            Time          22EPIC Rev 08/18

## 2018-09-04 NOTE — ED TRIAGE NOTES
"Pt presents with son.  Pt is having increased weakness. Pt is concerned that he cannot move around as he used to. States he is drinking Lesa everyday.  States at times, drinks to the point of \"blacking out.\" Daughter found pt lying in bathroom last evening. Son noticed patient shaking in chair this morning.  Noticed that he is having trouble getting out of chair. Wife  one year ago.  Pt is tearful.    "

## 2018-09-04 NOTE — PHARMACY-ADMISSION MEDICATION HISTORY
Admission medication history interview status for this patient is complete. See [facility] admission navigator for allergy information, prior to admission medications and immunization status.     Medication history interview source(s):Patient and Family  Medication history resources (including written lists, pill bottles, clinic record):Pill box  [facility]    Changes made to PTA medication list:  Added: metoprolol xl  Deleted: atenolol  Changed: -    Actions taken by pharmacist (provider contacted, etc):None     Additional medication history information:None    Medication reconciliation/reorder completed by provider prior to medication history? No    Do you take OTC medications (eg tylenol, ibuprofen, fish oil, eye/ear drops, etc)? yes(Y/N)    For patients on insulin therapy: no (Y/N)  Lantus/levemir/NPH/Mix 70/30 dose:   (Y/N) (see Med list for doses)   Sliding scale Novolog Y/N  If Yes, do you have a baseline novolog pre-meal dose:  units with meals  Patients eat three meals a day:   Y/N    How many episodes of hypoglycemia do you have per week: _______  How many missed doses do you have per week: ______  How many times do you check your blood glucose per day: _______   Any Barriers to therapy - Be specific :  cost of medications, comfortable with giving injections (if applicable), comfortable and confident with current diabetes regimen: Y/N ______________      Prior to Admission medications    Medication Sig Last Dose Taking? Auth Provider   amLODIPine (NORVASC) 10 MG tablet Take 10 mg by mouth daily 9/4/2018 at Unknown time Yes Unknown, Entered By History   atorvastatin (LIPITOR) 40 MG tablet Take 40 mg by mouth daily 9/3/2018 at pm Yes Unknown, Entered By History   finasteride (PROSCAR) 5 MG tablet Take 5 mg by mouth daily 9/4/2018 at Unknown time Yes Unknown, Entered By History   fish oil-omega-3 fatty acids 1000 MG capsule Take 1 g by mouth daily 9/3/2018 at pm Yes Unknown, Entered By  History   lisinopril (PRINIVIL,ZESTRIL) 20 MG tablet Take 20 mg by mouth daily 9/4/2018 at Unknown time Yes Unknown, Entered By History   metoprolol succinate (TOPROL-XL) 50 MG 24 hr tablet Take 50 mg by mouth daily 9/4/2018 at Unknown time Yes Unknown, Entered By History   Multiple Vitamins-Minerals (PRESERVISION AREDS 2 PO) Take 1 capsule by mouth 2 times daily 9/4/2018 at x1 Yes Unknown, Entered By History   multivitamin, therapeutic with minerals (THERA-VIT-M) TABS Take 1 tablet by mouth daily 9/4/2018 at Unknown time Yes Unknown, Entered By History   rivaroxaban ANTICOAGULANT (XARELTO) 15 MG TABS tablet Take 1 tablet (15 mg) by mouth daily (with dinner) 9/3/2018 at Unknown time Yes Rad Carmona MD   Vitamin D, Cholecalciferol, 1000 UNITS TABS Take 1,000 Units by mouth daily 9/3/2018 at pm Yes Unknown, Entered By History

## 2018-09-04 NOTE — ED PROVIDER NOTES
"  History     Chief Complaint:  Dizziness    The history is provided by the patient.      Neo Marcos is an anticoagulated 86 year old male on Xarelto with a history of atrial fibrillation and HTN who presents to the emergency department with his son for evaluation of dizziness and being off balance. Of note, a week ago the patient fell, hitting his head, after he had been drinking. Then yesterday around 1700 after drinking, the patient fell again, but reportedly did not hit his head. He was found by his daughter on the bathroom floor because he could not help himself up. Then this morning, the patient was having difficulty getting out of his chair, noting he felt like he was going to fall. He denies drinking this morning. The increase in weakness prompted the patient to seek evaluation here in the emergency department.    Here, the patient states he has recently been drinking \"a lot\" of alcohol which causes him to fall recently. He states he has been living alone after losing his wife a year ago. He states he cannot talk about her, becoming teary eyed. He states when he drinks, he blacks out and his dog comes over to find him. He states he drinks daily and blacks out about 3-4 times each week secondary to the alcohol. He reports going through alcohol withdrawal once in the past. He states he does not sleep much. He reports back pain, nausea, and some chest \"tightness\" but denies vomiting. He denies eating this morning because he did not want to get up out of his chair.       Allergies:  NKDA     Medications:    Amlodipine  Atenolol  Lipitor  Finasteride  Lisinopril  Xarelto     Past Medical History:    Atrial fibrillation  HTN  Non senile cataract    Past Surgical History:    ENT surgery  Eye surgery  Hernia repair  Orthopedia surgery    Family History:    No past pertinent family history.    Social History:  Former smoke  Positive for alcohol use  Marital Status:        Review of Systems " "  Gastrointestinal: Positive for nausea. Negative for vomiting.   Neurological: Positive for dizziness and weakness.   All other systems reviewed and are negative.    Physical Exam   First Vitals:  BP: 169/76  Heart Rate: 87  Temp: 97.6  F (36.4  C)  Resp: 20  SpO2: 93 %      Physical Exam  General: Patient is alert and interactive when I enter the room  Head:  The scalp, face, and head appear normal  Eyes:  Conjunctivae are normal  ENT:    The nose is normal    Pinnae are normal    External acoustic canals are normal  Neck:  Trachea midline, good ROM  CV:  Pulses are normal.    Resp:  No respiratory distress   Abdomen:      Soft, non-tender, non-distended  Musc:  Normal muscular tone    No major joint effusions  Skin:  No rash or lesions noted, dry mucous membranes   Neuro:  Speech is normal and fluent. Face is symmetric.     Moving all extremities well. Mild hand tremors and tongue fasciculations.   Psych: Awake. Alert.  Normal affect. Depressed mood. Appropriate interactions.    Emergency Department Course   ECG:  Indication: history of atrial fibrillation, \"some chest tightness\"  Time: 1138  Vent. Rate 88 bpm. DC interval 266. QRS duration 116. QT/QTc 380/459. P-R-T axis 66 -34 105. Sinus rhythm with 1st degree A-V block. Left axis deviation. Anteroseptal infarct, age undetermined. ST & T wave abnormality, consider lateral ischemia. Abnormal ECG. Read time: 1140.    Vent. rate 88 BPM  DC interval 266 ms  QRS duration 116 ms  QT/QTc 380/459 ms  P-R-T axes 66 -34 105    Imaging:  Radiographic findings were communicated with the patient who voiced understanding of the findings.    CT Head without contrast:   No acute intracranial abnormality. No evidence of  hemorrhage. As per radiology.    Laboratory:  Magnesium: 2.4 (H)  CBC: WBC: 6.4, HGB: 14.1, PLT: 212  BMP: ALL WNL (Creatinine: 0.83)  1244 Troponin: 0.051 (h)    UA with micro: ketones 5, protein albumin 100, mucous present o/w " negative    Interventions:  1415 NS 1L IV  1417 Ativan 0.2 mg IV    Emergency Department Course:  1134 Nursing notes and vitals reviewed.  I performed an exam of the patient as documented above.     IV inserted. Medicine administered as documented above. Blood drawn. This was sent to the lab for further testing, results above.    EKG obtained in the ED, see results above.     The patient provided a urine sample here in the emergency department. This was sent for laboratory testing, findings above.     The patient was sent for a head CT while in the emergency department, findings above.     1505 I rechecked the patient.    1515  I consulted with Dr. Hodge of the hospitalist services. They are in agreement to accept the patient for admission.    1530 I rechecked the patient and discussed the results of his workup thus far.     Findings and plan explained to the Patient who consents to admission. Discussed the patient with Dr. Hodge, who will admit the patient to a medical bed for further monitoring, evaluation, and treatment.  Impression & Plan    Medical Decision Making:  Neo Marcos is a 86 year old male with a history of atrial fibrillation and HTN who presents with dizziness, recurrent falls, and alcohol abuse. Patient arrived and he appears quite clinically dehydrated. He has poor skin turgor and dry mucous membranes. He also appears to be having some mild symptoms of alcohol withdrawal. He has mild tongue vesiculations and mild hand tremors. However, he is not in severe alcohol withdrawal. He does admit to having an alcohol withdrawal previously. His vitals are normal. Patient was given IV fluids, 0.2 mg Ativan. We also did a head CT given his recent fall and being on Xarelto, though this was negative. The rest of his blood work was negative including his electrolytes. He does have some mild elevation in his troponin but he denies any chest pain and his EKG is unremarkable. He is quite depressed and I am  concerned about him going home. Patient and son agree that he should probably come into the hospital. Patient was agreeable to being admitted. He does live alone, and with his recurrent falls on Xarelto, it may be best to readdress this. Patient will be admitted to medicine.     Critical Care time:  none    Diagnosis:    ICD-10-CM    1. Alcohol withdrawal syndrome with complication (H) F10.239        Disposition:  Admitted to Dr. Naveen Lozano Disclosure:   I, Yolanda Ridley, am serving as a scribe on 9/4/2018 at 11:34 AM to personally document services performed by Yelitza Bundy MD based on my observations and the provider's statements to me.     Yolanda Ridley  9/4/2018   [facility] EMERGENCY DEPARTMENT       Yelitza Bundy MD  09/04/18 0088

## 2018-09-04 NOTE — ED NOTES
[facility]  ED Nurse Handoff Report    Neo Marcos is a 86 year old male   ED Chief complaint: No chief complaint on file.  . ED Diagnosis:   Final diagnoses:   Alcohol withdrawal syndrome with complication (H)     Allergies: No Known Allergies    Code Status: Full Code  Activity level - Baseline/Home:  Independent. Activity Level - Current:   Stand with Assist. Lift room needed: No. Bariatric: No   Needed: No   Isolation: No. Infection: Not Applicable.     Vital Signs:   Vitals:    18 1345 18 1400 18 1415 18 1430   BP: 145/72 136/72 139/75 144/67   Resp:  24 22 28   Temp:       TempSrc:       SpO2: 90% 92% 91%        Cardiac Rhythm:  ,      Pain level:    Patient confused: No. Patient Falls Risk: Yes.   Elimination Status: Has voided   Patient Report - Initial Complaint: Dizziness. Focused Assessment:    Falls Assessment Recent Fall History - Fall history within last six months: yes  Mobility Factors - Ambulation: 0-->independent Transferrin-->independent  Cognition Issues - Cognition: 0 - no cognition issues reported   Neurological Cognitive/Perceptual/Neuro - Cognitive/Neuro/Behavioral WDL:  WDL except (Pt noted to be shaky/tremulous.)   Musculoskeletal Musculoskeletal - Musculoskeletal WDL:  WDL except (C/O low back pain. )       Tests Performed: labs, imaging. Abnormal Results:   Labs Ordered and Resulted from Time of ED Arrival Up to the Time of Departure from the ED   CBC WITH PLATELETS DIFFERENTIAL - Abnormal; Notable for the following:        Result Value    RDW 16.9 (*)     Absolute Lymphocytes 0.5 (*)     All other components within normal limits   TROPONIN I - Abnormal; Notable for the following:     Troponin I ES 0.051 (*)     All other components within normal limits   ROUTINE UA WITH MICROSCOPIC - Abnormal; Notable for the following:     Ketones Urine 5 (*)     Protein Albumin Urine 100 (*)     Mucous Urine Present (*)     All other  components within normal limits   MAGNESIUM - Abnormal; Notable for the following:     Magnesium 2.4 (*)     All other components within normal limits   BASIC METABOLIC PANEL     CT Head w/o Contrast   Final Result   IMPRESSION:  No acute intracranial abnormality. No evidence of   hemorrhage.      ANTOINETTE LECHUGA MD      .   Treatments provided: IVF, Ativan  Family Comments: aware of admission  OBS brochure/video discussed/provided to patient:  No  ED Medications:   Medications   0.9% sodium chloride BOLUS (1,000 mLs Intravenous New Bag 9/4/18 1415)     Followed by   sodium chloride 0.9% infusion (not administered)   LORazepam (ATIVAN) injection 0.2 mg (0.2 mg Intravenous Given 9/4/18 1417)     Drips infusing:  No  For the majority of the shift, the patient's behavior Green. Interventions performed were NA.     Severe Sepsis OR Septic Shock Diagnosis Present: No      ED Nurse Name/Phone Number: Anisha Contreras,   3:24 PM    RECEIVING UNIT ED HANDOFF REVIEW    Above ED Nurse Handoff Report was reviewed: Yes-room not yet cleaned, will call when ready.   Reviewed by: Bisi Suggs on September 4, 2018 at 4:47 PM

## 2018-09-04 NOTE — H&P
[facility]  Hospitalist H&P    Name: Neo Marcos      MRN: [id number]  YOB: 1931    Age: 86 year old  Date of admission: 9/4/2018  Primary care provider: [facility], [place]            Assessment and Plan:   Neo Marcos is a 86 year old male with a history of fibrillation, hypertension, BPH, and hyperlipidemia who presents with frequent falls and weakness in the context of depression and recent heavy alcohol use.    1.  Alcohol use disorder with early mild withdrawal: Only a minimal tremor at the bedside.  His last drink was 24 hours ago.  Recommend the CIWA protocol.  Continue oral thiamine, multivitamin, and folate.  Given his advanced age I do not think chemical dependency consultation is warranted.  Monitor for progression of withdrawal symptoms overnight.    2.  Depression: Appears to be severe grief response to his wife's death.  He has been coping with excessive amounts of alcohol.  Will request psychiatry consultation as he would likely benefit from an SSRI.    3.  Atrial fibrillation: Continue atenolol.  Currently normal sinus rhythm.  He takes Xarelto for stroke prevention.  I indicated to him that if he does not stop drinking and/or if his falls continue that he should seriously consider discontinuation of anticoagulation.  We will continue this overnight and revisit the issue tomorrow.    4.  Hypertension: Blood pressure is reasonable.  Continue prior to admission lisinopril and atenolol and amlodipine.    5.  Troponin elevation: Minimal and likely secondary to dehydration.  Currently no chest pain and EKG shows no evidence of ischemia.  Check serial troponins and echocardiogram.    6.  Deconditioning and falls: No significant trauma noticed.  Will request PT evaluation for home safety.    Code status: Full.  Admit to observation status.  Prophylaxis: Xarelto.  Disposition: Likely home tomorrow.            Chief Complaint:   Falls.         History of  Present Illness:   Neo Marcos is a 86 year old male who presents with frequent falls.  The patient was brought to the emergency department with by his son.  History is obtained from my discussion with the patient and his son at the bedside.  I also discussed the case with Dr. Bundy, the ED physician.  The EMR was also reviewed.    The patient's wife  several months ago.  He has been extremely depressed since that time.  He does not endorse any suicidal ideation.  He has been coping by using large amounts of alcohol essentially on a daily basis.  He reports drinking half a pint to 1 pint per day.  His last drink was 24 hours ago.  He lives alone in a house.  He has had more difficulty getting around the house, now falling frequently and been quite weak and deconditioned.  He has not had a loss of consciousness.  He has had a poor appetite and poor oral intake for several weeks.  Prior to this he was very functional walking several times a day.    His workup in the emergency department was fairly unremarkable.  Head CT shows no acute pathology.  Labs are essentially unremarkable except for minimally elevated troponin.  He is being admitted for monitoring of potential withdrawal symptoms as well as to address his deconditioning and depression.            Past Medical History:     Past Medical History:   Diagnosis Date     Atrial fibrillation (H)      Hypertension      Nonsenile cataract              Past Surgical History:     Past Surgical History:   Procedure Laterality Date     ENT SURGERY       EYE SURGERY       HERNIA REPAIR       ORTHOPEDIC SURGERY               Social History:     Social History   Substance Use Topics     Smoking status: Former Smoker     Smokeless tobacco: Never Used     Alcohol use Yes             Family History:   The family history was fully reviewed and non-contributory in this case.         Allergies:   No Known Allergies          Medications:     Prior to Admission  medications    Medication Sig Last Dose Taking? Auth Provider   amLODIPine (NORVASC) 10 MG tablet Take 10 mg by mouth daily 9/4/2018 at Unknown time Yes Unknown, Entered By History   atenolol (TENORMIN) 25 MG tablet Take 25 mg by mouth daily 9/4/2018 at Unknown time Yes Unknown, Entered By History   atorvastatin (LIPITOR) 40 MG tablet Take 40 mg by mouth daily 9/4/2018 at Unknown time Yes Unknown, Entered By History   Calcium Carb-Cholecalciferol (CALCIUM 600 + D) 600-200 MG-UNIT TABS Take 1 tablet by mouth daily 9/4/2018 at Unknown time Yes Unknown, Entered By History   finasteride (PROSCAR) 5 MG tablet Take 5 mg by mouth daily 9/4/2018 at Unknown time Yes Unknown, Entered By History   fish oil-omega-3 fatty acids 1000 MG capsule Take 1 g by mouth daily 9/4/2018 at Unknown time Yes Unknown, Entered By History   lisinopril (PRINIVIL,ZESTRIL) 20 MG tablet Take 20 mg by mouth daily 9/4/2018 at Unknown time Yes Unknown, Entered By History   Multiple Vitamins-Minerals (PRESERVISION AREDS 2 PO) Take 1 capsule by mouth 2 times daily 9/4/2018 at Unknown time Yes Unknown, Entered By History   multivitamin, therapeutic with minerals (THERA-VIT-M) TABS Take 1 tablet by mouth daily 9/4/2018 at Unknown time Yes Unknown, Entered By History   Vitamin D, Cholecalciferol, 1000 UNITS TABS Take 1,000 Units by mouth daily 9/4/2018 at Unknown time Yes Unknown, Entered By History   rivaroxaban ANTICOAGULANT (XARELTO) 15 MG TABS tablet Take 1 tablet (15 mg) by mouth daily (with dinner)   Rad Carmona MD             Review of Systems:   A Comprehensive greater than 10 system review of systems was carried out.  Pertinent positives and negatives are noted above.  Otherwise negative for contributory information.           Physical Exam:   Blood pressure 144/67, temperature 97.6  F (36.4  C), temperature source Oral, resp. rate 28, SpO2 91 %.  Wt Readings from Last 1 Encounters:   07/18/18 62.8 kg (138 lb 7.2 oz)      Exam:  GENERAL: No apparent distress. Awake, alert, and fully oriented.  HEENT: Normocephalic, atraumatic. Extraocular movements intact.  CARDIOVASCULAR: Regular rate and rhythm without murmurs or rubs. No S3.  PULMONARY: Clear to auscultation bilaterally.  ABDOMINAL: Soft, non-tender, non-distended. Bowel sounds normoactive.   EXTREMITIES: No cyanosis or clubbing. No appreciable edema.  NEUROLOGICAL: CN 2-12 grossly intact, mild upper extremity tremors but no other focal neurological deficits.  DERMATOLOGICAL: No rash, ulcer, bruising, nor jaundice.          Data:   EKG:  Personally reviewed. NSR no ischemia. Prolonged LA.    Laboratory:    Recent Labs  Lab 09/04/18  1244   WBC 6.4   HGB 14.1   HCT 42.5   MCV 94          Recent Labs  Lab 09/04/18  1244      POTASSIUM 4.3   CHLORIDE 105   CO2 26   ANIONGAP 10   GLC 84   BUN 20   CR 0.83   GFRESTIMATED 87   GFRESTBLACK >90   AHMET 8.5       Recent Labs  Lab 09/04/18  1244   TROPI 0.051*       Recent Labs  Lab 09/04/18  1347   COLOR Yellow   APPEARANCE Clear   URINEGLC Negative   URINEBILI Negative   URINEKETONE 5*   SG 1.015   UBLD Negative   URINEPH 6.0   PROTEIN 100*   NITRITE Negative   LEUKEST Negative   RBCU 2   WBCU 1     No results for input(s): CULT in the last 168 hours.    Imaging:  Recent Results (from the past 24 hour(s))   CT Head w/o Contrast    Narrative    CT SCAN OF THE HEAD WITHOUT CONTRAST   9/4/2018 1:33 PM     HISTORY:  Head injury, on thinners.     TECHNIQUE:  Axial images of the head and coronal reformations without  IV contrast material. Radiation dose for this scan was reduced using  automated exposure control, adjustment of the mA and/or kV according  to patient size, or iterative reconstruction technique.    COMPARISON: Head CT 8/30/2017    FINDINGS:  Moderate to severe volume loss is present. Patchy white  matter hypoattenuation likely represents chronic small vessel ischemic  change. No evidence of acute ischemia,  hemorrhage, mass, mass effect,  or hydrocephalus. The visualized calvarium, skull base, paranasal  sinuses, and extracranial soft tissues are unremarkable. Bilateral  lens replacements are present.      Impression    IMPRESSION:  No acute intracranial abnormality. No evidence of  hemorrhage.    MD Ramon MANNING DO MPH  [facility] Hospitalist  [address] [facility] [place] [address]  Pager: [phone]  09/04/2018

## 2018-09-04 NOTE — CONSULTS
9/4/18 cd consult acknowledged.  Per EMR, patient has Medicare and would need to seek substance use serviceschem dep services from [facility] program as they are only Medicare approved facility for chem dep treatment services. Social work can assist with resource and referral for patient to schedule a substance use assessment with  [facility].

## 2018-09-04 NOTE — Clinical Note
Admitting Physician: CAIN STERN [[id number]]   [id number]   Bed Type: Observation Unit [54]   Special needs: Fall Risk [8]

## 2018-09-04 NOTE — IP AVS SNAPSHOT
MRN:[id number]                      After Visit Summary   9/4/2018    Neo Marcos    MRN: [id number]           Thank you!     Thank you for choosing [facility] for your care. Our goal is always to provide you with excellent care. Hearing back from our patients is one way we can continue to improve our services. Please take a few minutes to complete the written survey that you may receive in the mail after you visit. If you would like to speak to someone directly about your visit please contact Patient Relations at [phone]. Thank you!          Patient Information     Date Of Birth          9/5/1931        Designated Caregiver       Most Recent Value    Caregiver    Will someone help with your care after discharge? yes    Name of designated caregiver Zamzam    Phone number of caregiver [phone]      About your hospital stay     You were admitted on:  September 4, 2018 You last received care in the:  [place] [address] [facility]    You were discharged on:  September 5, 2018       Who to Call     For medical emergencies, please call 911.  For non-urgent questions about your medical care, please call your primary care provider or clinic, [phone]          Attending Provider     Provider Specialty    Yelitza Bundy MD Emergency Medicine    Ramon Hodge DO Internal Medicine       Primary Care Provider Office Phone # Fax #    [place] [facility] [phone]      After Care Instructions     Activity       Your activity upon discharge: activity as tolerated            Diet       Follow this diet upon discharge: Orders Placed This Encounter      Regular Diet Adult                  Follow-up Appointments     Follow-up and recommended labs and tests        Follow up with primary care provider, [place] [facility], within 7 days for hospital follow- up.  No follow up labs or test are needed.   Abstain from alcohol.  Consider treatment for  "depression.                  Pending Results     Date and Time Order Name Status Description    2018 1722 ECHO COMPLETE WITH [facility] In process             Admission Information     Date & Time Provider Department Dept. Phone    2018 Ramon Hodge DO [place] [address] [facility] [phone]      Your Vitals Were     Blood Pressure Temperature Respirations Height Weight Pulse Oximetry    146/58 95.5  F (35.3  C) (Axillary) 18 1.676 m (5' 6\") 67.2 kg (148 lb 3.2 oz) 92%    BMI (Body Mass Index)                   23.92 kg/m2           MyChart Information     [employer] lets you send messages to your doctor, view your test results, renew your prescriptions, schedule appointments and more. To sign up, go to [email or web address] . Click on \"Log in\" on the left side of the screen, which will take you to the Welcome page. Then click on \"Sign up Now\" on the right side of the page.     You will be asked to enter the access code listed below, as well as some personal information. Please follow the directions to create your username and password.     Your access code is: [email or web address]  Expires: 2018 11:41 PM     Your access code will  in 90 days. If you need help or a new code, please call your [facility] clinic or [phone].        Care EveryWhere ID     This is your Care EveryWhere ID. This could be used by other organizations to access your [facility] medical records  [id number]        Equal Access to Services     [facility] DistrictSHAYAN AH: Hadii aad ku hadasho Soomaali, waaxda luqadaha, qaybta kaalmada adeegyada, roger gilbert . So [facility] [phone].    ATENCIÓN: Si habla español, tiene a newberry disposición servicios gratuitos de asistencia lingüística. Llame al [phone].    We comply with applicable federal civil rights laws and [place] laws. We do not discriminate on the basis of race, color, national origin, age, disability, sex, sexual orientation, or gender " identity.               Review of your medicines      CONTINUE these medicines which have NOT CHANGED        Dose / Directions    amLODIPine 10 MG tablet   Commonly known as:  NORVASC        Dose:  10 mg   Take 10 mg by mouth daily   Refills:  0       atorvastatin 40 MG tablet   Commonly known as:  LIPITOR        Dose:  40 mg   Take 40 mg by mouth daily   Refills:  0       finasteride 5 MG tablet   Commonly known as:  PROSCAR        Dose:  5 mg   Take 5 mg by mouth daily   Refills:  0       fish oil-omega-3 fatty acids 1000 MG capsule        Dose:  1 g   Take 1 g by mouth daily   Refills:  0       lisinopril 20 MG tablet   Commonly known as:  PRINIVIL/ZESTRIL        Dose:  20 mg   Take 20 mg by mouth daily   Refills:  0       metoprolol succinate 50 MG 24 hr tablet   Commonly known as:  TOPROL-XL        Dose:  50 mg   Take 50 mg by mouth daily   Refills:  0       * multivitamin, therapeutic with minerals Tabs tablet        Dose:  1 tablet   Take 1 tablet by mouth daily   Refills:  0       * PRESERVISION AREDS 2 PO        Dose:  1 capsule   Take 1 capsule by mouth 2 times daily   Refills:  0       rivaroxaban ANTICOAGULANT 15 MG Tabs tablet   Commonly known as:  XARELTO   Used for:  Chronic anticoagulation        Dose:  15 mg   Take 1 tablet (15 mg) by mouth daily (with dinner)   Refills:  0       Vitamin D (Cholecalciferol) 1000 units Tabs        Dose:  1000 Units   Take 1,000 Units by mouth daily   Refills:  0       * Notice:  This list has 2 medication(s) that are the same as other medications prescribed for you. Read the directions carefully, and ask your doctor or other care provider to review them with you.             Protect others around you: Learn how to safely use, store and throw away your medicines at www.disposemymeds.org.             Medication List: This is a list of all your medications and when to take them. Check marks below indicate your daily home schedule. Keep this list as a reference.       Medications           Morning Afternoon Evening Bedtime As Needed    amLODIPine 10 MG tablet   Commonly known as:  NORVASC   Take 10 mg by mouth daily   Last time this was given:  10 mg on 9/5/2018  8:11 AM            Next dose 9/6/2018 am                       atorvastatin 40 MG tablet   Commonly known as:  LIPITOR   Take 40 mg by mouth daily                        Next dose 9/5/2018 pm           finasteride 5 MG tablet   Commonly known as:  PROSCAR   Take 5 mg by mouth daily   Last time this was given:  5 mg on 9/5/2018  8:11 AM            Next dose 9/6/2018 am                       fish oil-omega-3 fatty acids 1000 MG capsule   Take 1 g by mouth daily            Next dose 9/6/2018 am                       lisinopril 20 MG tablet   Commonly known as:  PRINIVIL/ZESTRIL   Take 20 mg by mouth daily   Last time this was given:  20 mg on 9/5/2018  8:11 AM            Next dose 9/6/2018                       metoprolol succinate 50 MG 24 hr tablet   Commonly known as:  TOPROL-XL   Take 50 mg by mouth daily   Last time this was given:  50 mg on 9/5/2018  8:11 AM            Next dose 9/6/2018 am                       * multivitamin, therapeutic with minerals Tabs tablet   Take 1 tablet by mouth daily   Last time this was given:  1 tablet on 9/5/2018  8:11 AM            Next dose 9/6/2018 am                       * PRESERVISION AREDS 2 PO   Take 1 capsule by mouth 2 times daily                       Next dose 9/5/2018 evening               rivaroxaban ANTICOAGULANT 15 MG Tabs tablet   Commonly known as:  XARELTO   Take 1 tablet (15 mg) by mouth daily (with dinner)   Last time this was given:  15 mg on 9/4/2018  6:18 PM                    Next dose 9/5/2018 with dinner               Vitamin D (Cholecalciferol) 1000 units Tabs   Take 1,000 Units by mouth daily            Next dose 9/6/2018 am                       * Notice:  This list has 2 medication(s) that are the same as other medications prescribed for you. Read the  directions carefully, and ask your doctor or other care provider to review them with you.              More Information        Helping Yourself Through Grief and Loss  Do what you can to stay healthy. Reaching out for support will help a great deal. You may find yourself asking:  Why?  It s normal to seek meaning by asking questions, but there s not always an answer for loss. With time, your loss may still be part of your life, but not the only thing in it.    Take care of yourself  Taking good care of yourself helps your body heal from the physical and emotional symptoms of grief. Pay extra attention to healthy exercise, sleep, and eating routines. What else do you need to feel better? Having family around can help you feel loved. Or you might need a walk or movie with friends to take your mind off things for a little while.  Accept support  Joining the world again is part of healing. These tips may help:    Stay in touch with family and friends, even if it s hard to talk.    Tell people how they can help. It can be as simple as walking your dog.    Stick to a daily routine that keeps you connected to friends and family.    Try to avoid making major decisions     Attend a support group of people who have been through the same type of loss.  When to get help  There's no normal length of time to grieve. But if you feel stuck and unable to move on, or if it has been 6 months or more since your loss and you still have signs of grief listed below, it may be time for professional help. Seeking professional help is not a sign of weakness. It indicates that you are taking responsibility for your recovery. Be alert to depression and call your healthcare provider if you:    Can t go to work or take care of the kids.    Can t eat or sleep normally.    Feel helpless, hopeless, or worthless.    Lose interest in hobbies, friends, and activities that used to give pleasure.    Gain or lose a lot of weight.    Feel your grief is  getting worse, or not getting any better.    Have repeated thoughts of suicide or of harming yourself. You can also call 9-1-1 or a crisis hotline (located in the yellow pages of your phonebook) if you have these thoughts.  At some point, you ll begin thinking about the future. You ll want to look ahead and make plans. To help yourself reach this point, try to do one thing each day to join in life. Keep at it, even if it feels strange at first. Your life can never be exactly the same. But one day you ll find you re living life fully again.  Date Last Reviewed: 11/10/2015    6174-5899 [employer]. [address] [facility], [place] [address]. All rights reserved. This information is not intended as a substitute for professional medical care. Always follow your healthcare professional's instructions.                Alcoholism: How to Be Part of the Solution  Family members can play a part in helping a loved one stop drinking. It may mean changing some of their own behavior. This can help their family member with recovery. Read below to learn more.    Being part of the problem  Enabling  People with alcoholism often need the help of others to keep drinking. They may need people to make excuses for them. They may need people to rescue them and take care of them. Giving this kind of help to a person with alcoholism is known as  enabling.  Family members may do this without even knowing it.  Letting it harm your own life  When a family member tries to care for a person with alcoholism, it can be hard to live a normal life of his or her own. This can be bad for his or her own mental health. This can also harm the lives of children in the family.  Getting nowhere  As long as people stick to the behaviors that allow alcoholism to control their lives, they are on a treadmill that never stops. To get off this treadmill, family members have to stop being part of the problem and start being part of the solution.    Being part of the solution  Don t enable  Family members need to end the support that enables the person with alcoholism to drink. A good way to start is by letting the person know and understand the effects of his or her drinking. This can help him or her face reality.  Focus on your own needs  Family members can stop letting their thoughts and actions be ruled by concerns about the person with alcoholism. They can then be free from that person s illness. By taking care of their own needs first, they can make progress toward a more normal life.  Get help  A family member cannot help someone overcome his or her alcoholism alone. There is help for those who can accept it. Learning how to deal with the disease may mean new ways of thinking. It may mean learning new behavior. Family members can be greatly helped by the guidance and support of others.  Date Last Reviewed: 6/1/2016 2000-2017 [employer]. [address] [place] [address], [place] [address]. All rights reserved. This information is not intended as a substitute for professional medical care. Always follow your healthcare professional's instructions.

## 2018-09-05 ENCOUNTER — APPOINTMENT (OUTPATIENT)
Dept: PHYSICAL THERAPY | Facility: CLINIC | Age: 83
End: 2018-09-05
Attending: HOSPITALIST
Payer: MEDICARE

## 2018-09-05 ENCOUNTER — APPOINTMENT (OUTPATIENT)
Dept: CARDIOLOGY | Facility: CLINIC | Age: 83
End: 2018-09-05
Attending: HOSPITALIST
Payer: MEDICARE

## 2018-09-05 VITALS
OXYGEN SATURATION: 92 % | RESPIRATION RATE: 18 BRPM | SYSTOLIC BLOOD PRESSURE: 146 MMHG | HEIGHT: 66 IN | TEMPERATURE: 95.5 F | WEIGHT: 148.2 LBS | DIASTOLIC BLOOD PRESSURE: 58 MMHG | BODY MASS INDEX: 23.82 KG/M2

## 2018-09-05 LAB
ANION GAP SERPL CALCULATED.3IONS-SCNC: 8 MMOL/L (ref 3–14)
BUN SERPL-MCNC: 10 MG/DL (ref 7–30)
CALCIUM SERPL-MCNC: 8 MG/DL (ref 8.5–10.1)
CHLORIDE SERPL-SCNC: 106 MMOL/L (ref 94–109)
CO2 SERPL-SCNC: 24 MMOL/L (ref 20–32)
CREAT SERPL-MCNC: 0.76 MG/DL (ref 0.66–1.25)
ERYTHROCYTE [DISTWIDTH] IN BLOOD BY AUTOMATED COUNT: 16.8 % (ref 10–15)
GFR SERPL CREATININE-BSD FRML MDRD: >90 ML/MIN/1.7M2
GLUCOSE SERPL-MCNC: 100 MG/DL (ref 70–99)
HCT VFR BLD AUTO: 43.3 % (ref 40–53)
HGB BLD-MCNC: 14.4 G/DL (ref 13.3–17.7)
MCH RBC QN AUTO: 31.1 PG (ref 26.5–33)
MCHC RBC AUTO-ENTMCNC: 33.3 G/DL (ref 31.5–36.5)
MCV RBC AUTO: 94 FL (ref 78–100)
PHOSPHATE SERPL-MCNC: 2.2 MG/DL (ref 2.5–4.5)
PLATELET # BLD AUTO: 180 10E9/L (ref 150–450)
POTASSIUM SERPL-SCNC: 3.8 MMOL/L (ref 3.4–5.3)
RBC # BLD AUTO: 4.63 10E12/L (ref 4.4–5.9)
SODIUM SERPL-SCNC: 138 MMOL/L (ref 133–144)
WBC # BLD AUTO: 7.3 10E9/L (ref 4–11)

## 2018-09-05 PROCEDURE — 99217 ZZC OBSERVATION CARE DISCHARGE: CPT | Performed by: HOSPITALIST

## 2018-09-05 PROCEDURE — 97161 PT EVAL LOW COMPLEX 20 MIN: CPT | Mod: GP

## 2018-09-05 PROCEDURE — 97116 GAIT TRAINING THERAPY: CPT | Mod: GP

## 2018-09-05 PROCEDURE — 36415 COLL VENOUS BLD VENIPUNCTURE: CPT | Performed by: HOSPITALIST

## 2018-09-05 PROCEDURE — 25000132 ZZH RX MED GY IP 250 OP 250 PS 637: Mod: GY | Performed by: HOSPITALIST

## 2018-09-05 PROCEDURE — 80048 BASIC METABOLIC PNL TOTAL CA: CPT | Performed by: HOSPITALIST

## 2018-09-05 PROCEDURE — 40000193 ZZH STATISTIC PT WARD VISIT

## 2018-09-05 PROCEDURE — 99207 ZZC NON-BILLABLE SERV PER CHARTING: CPT | Performed by: PSYCHOLOGIST

## 2018-09-05 PROCEDURE — 85027 COMPLETE CBC AUTOMATED: CPT | Performed by: HOSPITALIST

## 2018-09-05 PROCEDURE — G0378 HOSPITAL OBSERVATION PER HR: HCPCS

## 2018-09-05 PROCEDURE — 84100 ASSAY OF PHOSPHORUS: CPT | Performed by: HOSPITALIST

## 2018-09-05 PROCEDURE — 25500064 ZZH RX 255 OP 636: Performed by: HOSPITALIST

## 2018-09-05 PROCEDURE — A9270 NON-COVERED ITEM OR SERVICE: HCPCS | Mod: GY | Performed by: HOSPITALIST

## 2018-09-05 PROCEDURE — 93306 TTE W/DOPPLER COMPLETE: CPT | Mod: 26 | Performed by: INTERNAL MEDICINE

## 2018-09-05 RX ORDER — METOPROLOL SUCCINATE 50 MG/1
50 TABLET, EXTENDED RELEASE ORAL DAILY
Status: DISCONTINUED | OUTPATIENT
Start: 2018-09-05 | End: 2018-09-05 | Stop reason: HOSPADM

## 2018-09-05 RX ADMIN — LISINOPRIL 20 MG: 20 TABLET ORAL at 08:11

## 2018-09-05 RX ADMIN — METOPROLOL SUCCINATE 50 MG: 50 TABLET, EXTENDED RELEASE ORAL at 08:11

## 2018-09-05 RX ADMIN — Medication 100 MG: at 08:11

## 2018-09-05 RX ADMIN — FINASTERIDE 5 MG: 5 TABLET, FILM COATED ORAL at 08:11

## 2018-09-05 RX ADMIN — HUMAN ALBUMIN MICROSPHERES AND PERFLUTREN 3 ML: 10; .22 INJECTION, SOLUTION INTRAVENOUS at 09:15

## 2018-09-05 RX ADMIN — FOLIC ACID 1 MG: 1 TABLET ORAL at 08:11

## 2018-09-05 RX ADMIN — MULTIPLE VITAMINS W/ MINERALS TAB 1 TABLET: TAB at 08:11

## 2018-09-05 RX ADMIN — AMLODIPINE BESYLATE 10 MG: 10 TABLET ORAL at 08:11

## 2018-09-05 NOTE — PROGRESS NOTES
Care Coordination:  Call received from Dr Hodge regarding pt dc plan. Pt dc'd this morning with orders for OP Lexiscan. He will also need appt with PCP.    Call placed to [facility] [place] for scheduling appt. Pt already has appt scheduled for Friday 9/7 at 10:00. Call placed to Cardiopulmonary to verify correct orders for lexiscan.    Call placed to pt's son, Omar and VM left with follow up appt time/date and importance of making this appt.    Ximena Grayson RN CTS

## 2018-09-05 NOTE — PLAN OF CARE
Presentation/Diagnosis: Pt admitted this shift () due to falls at home, weakness and ETOH use. Pt diagnosed with alcohol withdrawal.   History: A-fib, HTN, depression (started after his wife passed away about a year ago), and ETOH use (drinking 0.5 pint of jose per day for the last 2-3 weeks per pt., last drink was 9/3 around 5pm)  Labs/Protocols: Mag, Phos and K protocols. K: 4.3, Ma.4, Troponins: 0.051, 0.064 and 0.060.   Vitals: VSS on RA, BP slightly elevated. No complaints of pain this shift.   Cardiac: WDL.   Telemetry: Sinus rhythm with 1st degree AVB.   Respiratory: WDL.   Neuro: A&Ox4. Calm and cooperative with cares. CIWA Q 4 hours. Scores this shift 3 and 2.   GI/: WDL.   Skin: Scattered bruising throughout, intact. History of skin cancer and skin grafting to face.   LDAs: PIV to R FA infusing LR @75mL/hr.   Diet: Regular diet, poor appetite. Per pt he hasn't had much of an appetite recently. Weight loss of about 8-10lbs per pt and son.   Activity: Ax1 with gait belt. Very unsteady on feet.   Teaching: Pt oriented to room. Pt educated on call light use. Pt educated not to get up without assistance (alarms in place). Pt educated on plan of care.   Plan: Await psychiatry, chem dep and social work consults.  Echo tomorrow. Monitor troponins Q 4 hours. Continue IV fluids. Monitor CIWA scores.

## 2018-09-05 NOTE — PLAN OF CARE
Problem: Patient Care Overview  Goal: Plan of Care/Patient Progress Review  Discharge Planner PT      PT: Orders received, eval completed, tx initiated. Neo Marcos is an anticoagulated 86 year old male on Xarelto with a history of atrial fibrillation and HTN who presents to the emergency department with his son for evaluation of dizziness and being off balance on 9/4/18. Of note, a week ago the patient fell, hitting his head, after he had been drinking. Pt presents with fear of falling, difficulty getting out of chair.  Pt lives in a single story home alone with 2 stairs to enter. At baseline ambulates IND and is IND with ADLs, family helps with cleaning, groceries, etc.    Patient plan for discharge: Home with assist from family  Current status: Pt IND with transfers and bed mobility. SBA with no assistive device with significantly decreased pace and step length. Trialed 4WW and SEC to increase stability and comfort with gait as he has both options available at home from his wife. Demo'd sizing to pt and his son, pt plans to use each situationally upon discharge. Up/down 2 stairs with B rail IND.  Barriers to return to prior living situation: Chemical dependency  Recommendations for discharge: Home with assist from family  Rationale for recommendations: Pt near baseline mobility, falls are alcohol related. Pt plans to start using assistive device for increase stability.       Entered by: Ervin Shukla 09/05/2018 10:18 AM     Physical Therapy Discharge Summary    Reason for therapy discharge:    All goals and outcomes met, no further needs identified.    Progress towards therapy goal(s). See goals on Care Plan in [facility] electronic health record for goal details.  Goals met    Therapy recommendation(s):    No further therapy is recommended.

## 2018-09-05 NOTE — PLAN OF CARE
"Problem: Substance Withdrawal  Goal: Substance Withdrawal  Signs and symptoms of listed problems will be absent or manageable.  Outcome: No Change  Pt admits to drinking half a pint of jose each day for the last 2-3 weeks. Pt states \"I went a little overboard the other day\" Per pt's son the pt was referring to yesterday (). CIWA Q 4 hours. Scores this shift: 2 and 3. Chemical dependency consulted. Psychiatry consulted. Pt states he started drinking more after his wife passed away. (about 1 year ago). Pt states he does not feel like hurting himself. Pt does not admit to feeling depressed but does state he is sad regarding his wife's death. Pt has had decreased appetite, trouble sleeping and less interest in doing things as well since his wife . Pt notified that psychiatry and chemical dependency will come see him tomorrow.       "

## 2018-09-05 NOTE — PROGRESS NOTES
09/05/18 1000   Quick Adds   Type of Visit Initial PT Evaluation   Living Environment   Lives With alone   Living Arrangements house   Home Accessibility stairs to enter home   Number of Stairs to Enter Home 2   Number of Stairs Within Home 0   Stair Railings at Home outside, present at both sides   Transportation Available family or friend will provide   Living Environment Comment Lives alone in single story home   Self-Care   Usual Activity Tolerance moderate   Current Activity Tolerance moderate   Equipment Currently Used at Home none   Activity/Exercise/Self-Care Comment IND with mobility and ADLs. Family helps with groceries, cleaning, etc   Functional Level Prior   Ambulation 0-->independent   Transferring 0-->independent   Toileting 0-->independent   Bathing 0-->independent   Dressing 0-->independent   Eating 0-->independent   Fall history within last six months yes   Number of times patient has fallen within last six months (5)   Which of the above functional risks had a recent onset or change? fall history   Prior Functional Level Comment Has spouse's 4WW and SEC available, currently ambulates without AD. Falls while intoxicated   General Information   Onset of Illness/Injury or Date of Surgery - Date 09/04/18   Referring Physician Ramon Hodge, DO   Patient/Family Goals Statement Return home   Pertinent History of Current Problem (include personal factors and/or comorbidities that impact the POC) Neo Marcos is an anticoagulated 86 year old male on Xarelto with a history of atrial fibrillation and HTN who presents to the emergency department with his son for evaluation of dizziness and being off balance. Of note, a week ago the patient fell, hitting his head, after he had been drinking. Pt presents with fear of falling, difficulty getting out of chair   Precautions/Limitations no known precautions/limitations   Cognitive Status Examination   Orientation orientation to person, place and  "time   Level of Consciousness alert   Pain Assessment   Patient Currently in Pain No   Posture    Posture Forward head position;Kyphosis   Range of Motion (ROM)   ROM Comment LE UE WNL   Strength   Strength Comments hip flex 3+/5 B, knee ext 5/5 B   Bed Mobility   Bed Mobility Comments IND   Transfer Skills   Transfer Comments IND sit<>stand   Gait   Gait Comments SBA without AD, dec pace and step length, NBOS, ambulates with downward gaze   General Therapy Interventions   Planned Therapy Interventions gait training   Clinical Impression   Criteria for Skilled Therapeutic Intervention yes, treatment indicated   PT Diagnosis Impaired gait   Influenced by the following impairments Generalized weakness   Functional limitations due to impairments Decreased safety with mobility   Clinical Presentation Stable/Uncomplicated   Clinical Presentation Rationale Medically Stable   Clinical Decision Making (Complexity) Low complexity   Therapy Frequency` 1 time/week   Predicted Duration of Therapy Intervention (days/wks) 1 Days   Anticipated Equipment Needs at Discharge other (see comments)  (Has DME at home)   Anticipated Discharge Disposition Home with Assist   Risk & Benefits of therapy have been explained Yes   Patient, Family & other staff in agreement with plan of care Yes   [facility] [employer] TM \"6 Clicks\"   2016, Trustees of [facility], under license to [employer]  All rights reserved.   6 Clicks Short Forms Basic Mobility Inpatient Short Form   [facility] [employer]  \"6 Clicks\" V.2 Basic Mobility Inpatient Short Form   1. Turning from your back to your side while in a flat bed without using bedrails? 4 - None   2. Moving from lying on your back to sitting on the side of a flat bed without using bedrails? 4 - None   3. Moving to and from a bed to a chair (including a wheelchair)? 4 - None   4. Standing up from a chair using your arms (e.g., wheelchair, or bedside chair)? 4 - None   5. To walk in " hospital room? 3 - A Little   6. Climbing 3-5 steps with a railing? 3 - A Little   Basic Mobility Raw Score (Score out of 24.Lower scores equate to lower levels of function) 22   Total Evaluation Time   Total Evaluation Time (Minutes) 8

## 2018-09-05 NOTE — PROGRESS NOTES
Hospitalist follow-up note:    Patient had been anxious to discharge while his echocardiogram was still pending so this was done.  His echocardiogram has now returned showing an ejection fraction of 40-45% with severe hypokinesis of the mid to basal inferior wall and possible severe basal inferior lateral wall hypokinesis.  This appears to be a new finding.  Throughout the hospitalization the patient denied any chest pain or shortness of breath.    I called the patient and talked to him about these findings.  He again reiterates that he does not have any chest pressure nor shortness of breath and feels well.  His troponin levels were flat during the hospitalization and not indicative of acute coronary syndrome.  As result I think that this abnormality can be worked up as an outpatient.  I have ordered a Lexiscan stress test to be performed this week.    The patient had requested that I updated his son as well.  I called his son and left a detailed voicemail regarding the abnormalities and the follow-up recommended which includes a Lexiscan stress test this week, follow up with his primary care physician in the next 1-2 days, and return to the hospital if there is any concerns regarding chest pain or shortness of breath.  I discussed the case with the care coordinator to ensure these arrangements are completed today.    Ramon Hodeg  September 5, 2018

## 2018-09-05 NOTE — DISCHARGE SUMMARY
Hospitalist Discharge Summary  [facility]    Neo Marcos MRN# [id number]   YOB: 1931 Age: 87 year old     Date of Admission:  9/4/2018  Date of Discharge:  9/5/2018  Admitting Physician:  Ramon Hodge,   Discharge Physician:  Raomn Hodge  Discharging Service:  Hospitalist     Primary Provider: [facility], [place]          Discharge Diagnosis:   Mechanical falls and generalized weakness  Alcohol abuse disorder  Depression  Hypertension  Mild troponin elevation  Atrial fibrillation  Deconditioning             Discharge Disposition:   Discharged to home           Allergies:   No Known Allergies           Discharge Medications:   Discharge Medication List as of 9/5/2018 11:34 AM      CONTINUE these medications which have NOT CHANGED    Details   amLODIPine (NORVASC) 10 MG tablet Take 10 mg by mouth daily, Historical      atorvastatin (LIPITOR) 40 MG tablet Take 40 mg by mouth daily, Historical      finasteride (PROSCAR) 5 MG tablet Take 5 mg by mouth daily, Historical      fish oil-omega-3 fatty acids 1000 MG capsule Take 1 g by mouth daily, Historical      lisinopril (PRINIVIL,ZESTRIL) 20 MG tablet Take 20 mg by mouth daily, Historical      metoprolol succinate (TOPROL-XL) 50 MG 24 hr tablet Take 50 mg by mouth daily, Historical      Multiple Vitamins-Minerals (PRESERVISION AREDS 2 PO) Take 1 capsule by mouth 2 times daily, Historical      multivitamin, therapeutic with minerals (THERA-VIT-M) TABS Take 1 tablet by mouth daily, Historical      rivaroxaban ANTICOAGULANT (XARELTO) 15 MG TABS tablet Take 1 tablet (15 mg) by mouth daily (with dinner), No Print OutStart on 08/01      Vitamin D, Cholecalciferol, 1000 UNITS TABS Take 1,000 Units by mouth daily, Historical                    Condition on Discharge:   Discharge condition: Stable   Discharge vitals: Blood pressure 146/58, temperature 95.5  F (35.3  C), temperature source Axillary, resp. rate 18, height 1.676  "m (5' 6\"), weight 67.2 kg (148 lb 3.2 oz), SpO2 92 %.   Code status on discharge: Full Code      BASIC PHYSICAL EXAMINATION:  GENERAL: No apparent distress.  CARDIOVASCULAR: Regular rate and rhythm without murmurs.  PULMONARY: Clear to auscultation bilaterally.   GASTROINTESTINAL: Abdomen soft, non-tender.  EXTREMITIES: No edema, pulses intact.  NEUROLOGIC: No focal deficits.            History of Illness:   See detailed admission note for full details.               Procedures excluding imaging which is summarized below:   Please see details in the electronic medical record.           Consultations:   CHEMICAL DEPENDENCY IP CONSULT  SOCIAL WORK IP CONSULT  SOCIAL WORK IP CONSULT  PSYCHIATRY IP CONSULT  PHYSICAL THERAPY ADULT IP CONSULT          Significant Results:   Results for orders placed or performed during the hospital encounter of 09/04/18   CT Head w/o Contrast    Narrative    CT SCAN OF THE HEAD WITHOUT CONTRAST   9/4/2018 1:33 PM     HISTORY:  Head injury, on thinners.     TECHNIQUE:  Axial images of the head and coronal reformations without  IV contrast material. Radiation dose for this scan was reduced using  automated exposure control, adjustment of the mA and/or kV according  to patient size, or iterative reconstruction technique.    COMPARISON: Head CT 8/30/2017    FINDINGS:  Moderate to severe volume loss is present. Patchy white  matter hypoattenuation likely represents chronic small vessel ischemic  change. No evidence of acute ischemia, hemorrhage, mass, mass effect,  or hydrocephalus. The visualized calvarium, skull base, paranasal  sinuses, and extracranial soft tissues are unremarkable. Bilateral  lens replacements are present.      Impression    IMPRESSION:  No acute intracranial abnormality. No evidence of  hemorrhage.    ANTOINETTE LECHUGA MD       Transthoracic Echocardiogram Results:  No results found for this or any previous visit (from the past 4320 hour(s)).             Pending Results: "   Unresulted Labs Ordered in the Past 30 Days of this Admission     No orders found for last 61 day(s).                      Discharge Instructions and Follow-Up:   Discharge instructions and follow-up:   Discharge Procedure Orders  Follow-up and recommended labs and tests    Order Comments: Follow up with primary care provider, [place] [facility], within 7 days for hospital follow- up.  No follow up labs or test are needed.   Abstain from alcohol.  Consider treatment for depression.     Activity   Order Comments: Your activity upon discharge: activity as tolerated   Order Specific Question Answer Comments   Is discharge order? Yes      Full Code     Diet   Order Comments: Follow this diet upon discharge: Orders Placed This Encounter     Regular Diet Adult   Order Specific Question Answer Comments   Is discharge order? Yes                Hospital Course:   Neo Marcos is a 86 year old male with a history of fibrillation, hypertension, BPH, and hyperlipidemia who presented to the ED yesterday with frequent falls and weakness in the context of depression and recent heavy alcohol use.  He had no pain in his trauma evaluation was unremarkable.  He reported recent heavy and regular alcohol use due to depression related to his wife's death.  He had mild tremors yesterday which was likely some degree of alcohol withdrawal but overnight and this morning he has no evidence of ongoing withdrawal symptoms.  He was seen by psychiatry and offered medications to address his depression but he declined.  He indicates that he will stop drinking on his own and does not express any interest or need and chemical dependency resources.  He will follow-up with his primary care physician with regards to any ongoing concerns for depression.  He has good family support.  He is in good spirits today and denies any suicidal ideation.  He worked with physical therapy and is safe to discharge home with family assistance.  Blood  pressure is well controlled and we have resumed his prior to admission lisinopril, atenolol, and amlodipine.  He also has atrial fibrillation and is currently in normal sinus rhythm.  He takes Xarelto for stroke prevention.  I indicated to him that if he continues to drink and has ongoing issues with falls this medication should be discontinued.  Given his intention to stop drinking and his stability and working with physical therapy is reasonable to continue the medication for the time being.  He is aware of the risk of significant trauma and catastrophic bleeding complications if he were to continue drinking and his falls were to continue.  He elects to continue the anticoagulation at this time.  He had a mild troponin elevation is likely representative of mild demand ischemia in the face of significant dehydration and mild alcohol withdrawal and recent falls.  The troponin level was flat.  Echocardiogram this morning is pending.  Patient is anxious to discharge and does not want to wait for the results.  I will call him if the results are abnormal but I anticipate it will be an unremarkable study.  He is feeling well today and appears suitable for discharge.    The patient was seen, examined, and counseled on this day. The hospitalization and plan of care was reviewed with the patient extensively. All questions were addressed and the patient agreed to follow-up as noted above.      Total time spent in face to face contact with the patient and coordinating discharge was:  35 Minutes    Ramon Hodge DO MPH  Critical access hospital Hospitalist  [address] [place] [place] [address]  Pager: [phone]  09/05/2018

## 2018-09-05 NOTE — CONSULTS
"CLINICAL NUTRITION SERVICES  -  ASSESSMENT NOTE      MALNUTRITION:  % Weight Loss:  Weight loss does not meet criteria for malnutrition --> has since gained  % Intake:  Decreased intake does not meet criteria for malnutrition --> despite documentation by MD patient himself denies and with recent wt gain  Subcutaneous Fat Loss:  Orbital region mild depletion and Upper arm region mild depletion  Muscle Loss:  Temporal region mild depletion, Acromion bone region mild depletion, Dorsal hand region mild depletion and Posterior calf region mild depletion  Fluid Retention:  None noted    Malnutrition Diagnosis: Non-Severe malnutrition  In Context of:  Chronic illness or disease  Environmental or social circumstances        REASON FOR ASSESSMENT  Neo Marcos is a 87 year old male seen by Registered Dietitian for Admission Nutrition Risk Screen - Reduced oral intake over the last month.      NUTRITION HISTORY  - Information obtained from patient and chart.    - Patient with a h/o depression (recent loss of wife), recent heavy alcohol use (1 pint of jose daily x 2-3 weeks).   - \"Poor appetite and poor oral intake for several weeks\" documented by MD.  - Patient reports he has been following a soft diet d/t removal of teeth \"5-6 months\" ago.  He has full upper and lower dentures but reports poor fit of lower dentures \"they swim around\".  Therefore does not consistently wear lower dentures.  He is able to tolerate most foods per his report, more recently scrambled eggs, hamburgers, soft fruits/vegetables, etc.    - Patient has Ensure at home and he reports drinking \"at least 1\" daily.    - Patient describes he does his own grocery shopping and meal preparation.  He also describes having \"lots of family nearby\" and they often bring meals - \"I have more than I could ever eat\".    - Despite documentation of recent poor oral intakes and etoh use, patient himself does not reference and denies when asked about " "appetite/recent changes in oral intakes.  \"I eat fine\".  He does endorse wt loss but states this occurred when teeth were pulled.   - NKFA.       CURRENT NUTRITION ORDERS  Diet Order:     Regular    Current Intake/Tolerance:  75% meal consumption since admission.        PHYSICAL FINDINGS  Observed  Upper dentures in place, no lower dentures or teeth, see below   Obtained from Chart/Interdisciplinary Team  Psych consult pending    ANTHROPOMETRICS  Height: 5' 6\"  Weight: 67.3 kg (148#)  Body mass index is 23.92 kg/(m^2).  Weight Status:  Normal BMI  IBW: 64.5 kg (142#)  % IBW: 104%  Weight History:  Wt Readings from Last 10 Encounters:   09/04/18 67.2 kg (148 lb 3.2 oz)   07/18/18 62.8 kg (138 lb 7.2 oz)   08/30/17 74.8 kg (165 lb)   07/26/16 74.8 kg (165 lb)   07/30/15 77.1 kg (169 lb 15.6 oz)   - Wt of 154# from care everywhere office visit 3/08/2018.  ?Previous 10% wt loss x <6 months, but has since regained to some degree?    - Patient reports he lost \"12 pounds\" after getting his teeth pulled. Does confirm some degree of wt loss.  Describes previous UBW of ~165-170#.    LABS  Labs reviewed    MEDICATIONS  Medications reviewed:  - Daily MVI/M      ASSESSED NUTRITION NEEDS PER APPROVED PRACTICE GUIDELINES:    Dosing Weight 67.3 kg   Estimated Energy Needs: 1116-6907 kcals (25-30 Kcal/Kg)  Justification: maintenance  Estimated Protein Needs:  grams protein (1.2-1.5 g pro/Kg)  Justification: Repletion and preservation of lean body mass  Estimated Fluid Needs: >1 mL/Kcal  Justification: maintenance    MALNUTRITION:  % Weight Loss:  Weight loss does not meet criteria for malnutrition --> has since gained  % Intake:  Decreased intake does not meet criteria for malnutrition --> despite documentation by MD patient himself denies and with recent wt gain  Subcutaneous Fat Loss:  Orbital region mild depletion and Upper arm region mild depletion  Muscle Loss:  Temporal region mild depletion, Acromion bone region mild " depletion, Dorsal hand region mild depletion and Posterior calf region mild depletion  Fluid Retention:  None noted    Malnutrition Diagnosis: Non-Severe malnutrition  In Context of:  Chronic illness or disease  Environmental or social circumstances    NUTRITION DIAGNOSIS:  Malnutrition related to previous exacerbation of decreased oral intakes 2/2 removal of teeth and ill-fitting dentures leading to wt loss/has since regained to small degree as evidenced by at least mild fat/muscle loss with coding of non-severe malnutrition.      NUTRITION INTERVENTIONS  Recommendations / Nutrition Prescription  Continue regular diet order.     Ok for prn supplements.  Patient denies scheduling.    Continue daily MVI/M.      If changed to inpatient, reweigh patient as able.       Implementation  Nutrition education: Provided education on protein sources, how to order supplements while admitted.     Medical Food Supplement: As above.    Collaboration and Referral of Nutrition care: Discussed POC with team during rounds.      Nutrition Goals  Patient to consume at least 75% of meals TID.       MONITORING AND EVALUATION:  Progress towards goals will be monitored and evaluated per protocol and Practice Guidelines        Mare Florez RD, LD  Clinical Dietitian  3rd floor/ICU: [phone]  All other floors: [phone]  Weekend/holiday: [phone]

## 2018-09-05 NOTE — PLAN OF CARE
Problem: Patient Care Overview  Goal: Plan of Care/Patient Progress Review  Outcome: Improving  PRIMARY DIAGNOSIS: GENERALIZED WEAKNESS    OUTPATIENT/OBSERVATION GOALS TO BE MET BEFORE DISCHARGE  1. Orthostatic performed: No    2. Tolerating PO medications: Yes    3. Return to near baseline physical activity: Yes    4. Cleared for discharge by consultants (if involved): Yes    Discharge Planner Nurse   Safe discharge environment identified: Yes  Barriers to discharge: No       Entered by: Katherine Campbell 09/05/2018 10:34 AM     Please review provider order for any additional goals.   Nurse to notify provider when observation goals have been met and patient is ready for discharge.      Pt a/o x4, up with SBA of one. States he drinks 1/2 pint of whiskey at times. Pt states his intention is to quite. He has a lot of support by children.

## 2018-09-05 NOTE — PLAN OF CARE
Problem: Patient Care Overview  Goal: Plan of Care/Patient Progress Review  Outcome: No Change  PRIMARY DIAGNOSIS: GENERALIZED WEAKNESS    OUTPATIENT/OBSERVATION GOALS TO BE MET BEFORE DISCHARGE  1. Orthostatic performed: No    2. Tolerating PO medications: Yes    3. Return to near baseline physical activity: No    4. Cleared for discharge by consultants (if involved): No    Discharge Planner Nurse   Safe discharge environment identified: Yes social work consulting  Barriers to discharge: Yes needs chemical dependency        Entered by: Parisa Mcduffie 09/05/2018 6:50 AM     Please review provider order for any additional goals.   Nurse to notify provider when observation goals have been met and patient is ready for discharge.  VSS except for elevated BP alert/oriented can be forgetful, denies pain on CIWA scores 3,3,2 for tremors. Elevated trops echo scheduled for the AM. Tele NSR w/ 1  degree AV block.

## 2018-09-05 NOTE — PLAN OF CARE
Problem: Patient Care Overview  Goal: Plan of Care/Patient Progress Review  OBSERVATION patient END time: 11:50    Pt discharged to home accompanied by son. Discharge instructions reviewed with Pt and son. All questions answered, pt had all belongings with him at discharge.

## 2018-09-05 NOTE — PLAN OF CARE
"PRIMARY DIAGNOSIS: \"GENERIC\" NURSING  OUTPATIENT/OBSERVATION GOALS TO BE MET BEFORE DISCHARGE:  1. ADLs back to baseline: No    2. Activity and level of assistance: Up with assist of 1.     3. Pain status: Pain free.    4. Return to near baseline physical activity: No     Discharge Planner Nurse   Safe discharge environment identified: No-Social work consulted for discharge planning.   Barriers to discharge: Yes-monitoring troponins. Need echocardiogram. Awaiting psychiatry and chemical dependency consult.        Entered by: Bisi Suggs 09/04/2018 11:01 PM     Please review provider order for any additional goals.   Nurse to notify provider when observation goals have been met and patient is ready for discharge.  "

## 2018-09-05 NOTE — CONSULTS
Psychiatric consultation was requested by Dr. Hodge due to concerns about underlying depression.  Time spent with patient and staff was 40 minutes.    History and physical were reviewed prior to patient being seen.  There is no significant past psychiatric history.  Patient lives alone but has many family members who live nearby and are involved in his life.  His wife  last year ().  They were  for over 60 years.  Since her death, he has been despondent and turned to alcohol as a means to deal with his grief.  He does not note this as a problem in the past prior to her death.  He states he now spends time with one of his sons who is retired.      Strengths include his strong supportive family network as well as his personal awareness of the impact his drinking has had on his functioning.  Liabilities include his mobility concerns that impact his ability to function independently.    Mental Status Exam:    Patient was oriented to time, place and person.  His recent and remote memory appear intact.  He was able to adequately attend to and concentrate on topics discussed.  His language and fund of knowledge were adequate.  His mood and affect were both positive and healthy.  He was further happy as today is his birthday.  In fact, he received a call from one of his children to wish him a happy birthday during our time together.    Diagnosis:    Alcohol abuse    Disposition:    The possibility of adding an anti depressant was discussed with patient.  He stated that he was not interested in this idea and felt that, with the support of his family, he would cease using alcohol.  He realizes that drinking is unhealthy for him and does not intend to drink once he returns home.  He denies any suicidal intent and appreciates the support he receives from his family.    No further mental health interventions are indicated at this time.  Any consideration of an anti depressant should be discussed with his  primary outpatient physician.  These recommendations were shared with Dr. Hodge.

## 2018-09-05 NOTE — PLAN OF CARE
"PRIMARY DIAGNOSIS: \"GENERIC\" NURSING  OUTPATIENT/OBSERVATION GOALS TO BE MET BEFORE DISCHARGE:  1. ADLs back to baseline: No    2. Activity and level of assistance: Up with assist of 1.     3. Pain status: Pain free.    4. Return to near baseline physical activity: No     Discharge Planner Nurse   Safe discharge environment identified: No-Social work consulted for discharge planning.   Barriers to discharge: Yes-monitoring troponins. Need echocardiogram. Awaiting psychiatry and chemical dependency consult.        Entered by: Bisi Suggs 09/04/2018 8:02 PM     Please review provider order for any additional goals.   Nurse to notify provider when observation goals have been met and patient is ready for discharge.  "

## 2018-09-11 ENCOUNTER — HOSPITAL ENCOUNTER (OUTPATIENT)
Dept: CARDIOLOGY | Facility: CLINIC | Age: 83
Discharge: HOME | End: 2018-09-11
Attending: HOSPITALIST | Admitting: HOSPITALIST
Payer: MEDICARE

## 2018-09-11 ENCOUNTER — HOSPITAL ENCOUNTER (OUTPATIENT)
Dept: NUCLEAR MEDICINE | Facility: CLINIC | Age: 83
Setting detail: NUCLEAR MEDICINE
End: 2018-09-11
Attending: HOSPITALIST
Payer: MEDICARE

## 2018-09-11 PROCEDURE — 78452 HT MUSCLE IMAGE SPECT MULT: CPT | Mod: 26 | Performed by: INTERNAL MEDICINE

## 2018-09-11 PROCEDURE — 93017 CV STRESS TEST TRACING ONLY: CPT

## 2018-09-11 PROCEDURE — 34300033 ZZH RX 343: Performed by: HOSPITALIST

## 2018-09-11 PROCEDURE — 93018 CV STRESS TEST I&R ONLY: CPT | Performed by: INTERNAL MEDICINE

## 2018-09-11 PROCEDURE — A9502 TC99M TETROFOSMIN: HCPCS | Performed by: HOSPITALIST

## 2018-09-11 PROCEDURE — 93016 CV STRESS TEST SUPVJ ONLY: CPT | Performed by: INTERNAL MEDICINE

## 2018-09-11 PROCEDURE — 78452 HT MUSCLE IMAGE SPECT MULT: CPT

## 2018-09-11 PROCEDURE — 25000128 H RX IP 250 OP 636

## 2018-09-11 RX ORDER — REGADENOSON 0.08 MG/ML
INJECTION, SOLUTION INTRAVENOUS
Status: COMPLETED
Start: 2018-09-11 | End: 2018-09-11

## 2018-09-11 RX ADMIN — TETROFOSMIN 33 MCI.: 1.38 INJECTION, POWDER, LYOPHILIZED, FOR SOLUTION INTRAVENOUS at 12:22

## 2018-09-11 RX ADMIN — TETROFOSMIN 11 MCI.: 1.38 INJECTION, POWDER, LYOPHILIZED, FOR SOLUTION INTRAVENOUS at 09:43

## 2018-09-11 RX ADMIN — REGADENOSON 0.4 MG: 0.08 INJECTION, SOLUTION INTRAVENOUS at 11:33

## 2018-09-11 NOTE — PROGRESS NOTES
Pre-procedure:  Are you having any pain or shortness of breath (prior to starting)? none  Initial vital signs: /52, HR 45, RR 16  Allergies reviewed: nka   Rhythm: Sinus  Medications taken within 48 hours of procedure: none   Last Caffeine: none  Lung sounds: CTA, no wheezing, crackles or rtx  crackles - RUL and CAREN  Health History (COPD, Asthma, etc): none           Procedure: Lexiscan  Reaction/symptoms after receiving Kaya injection: Shortness of breath  Intensity of Pain: none  Rhythm: sinus  1. Vital Signs:/45, HR 70, RR 16  2. Vital Signs:/46, HR 70, RR 16     Reversal agent: cola    Post:   Resolution of symptoms?: YES  Vital signs: /57, HR 72, RR 16  Vital signs: /57, HR 71, RR 16  Rhythm: sinus bundle  Walk: NO  Comment: none  Return to Radiology

## 2019-04-05 ENCOUNTER — HOSPITAL ENCOUNTER (EMERGENCY)
Facility: CLINIC | Age: 84
Discharge: HOME | End: 2019-04-05
Attending: PHYSICIAN ASSISTANT | Admitting: PHYSICIAN ASSISTANT
Payer: MEDICARE

## 2019-04-05 VITALS
WEIGHT: 140 LBS | TEMPERATURE: 98.1 F | BODY MASS INDEX: 22.6 KG/M2 | RESPIRATION RATE: 16 BRPM | HEART RATE: 67 BPM | OXYGEN SATURATION: 97 % | DIASTOLIC BLOOD PRESSURE: 77 MMHG | SYSTOLIC BLOOD PRESSURE: 153 MMHG

## 2019-04-05 DIAGNOSIS — S51.819A SKIN TEAR OF FOREARM WITHOUT COMPLICATION, INITIAL ENCOUNTER: ICD-10-CM

## 2019-04-05 PROCEDURE — 99282 EMERGENCY DEPT VISIT SF MDM: CPT

## 2019-04-05 RX ORDER — LIDOCAINE HYDROCHLORIDE AND EPINEPHRINE 10; 10 MG/ML; UG/ML
INJECTION, SOLUTION INFILTRATION; PERINEURAL
Status: DISCONTINUED
Start: 2019-04-05 | End: 2019-04-06 | Stop reason: HOSPADM

## 2019-04-05 ASSESSMENT — ENCOUNTER SYMPTOMS
NUMBNESS: 0
WOUND: 1

## 2019-04-05 NOTE — ED AVS SNAPSHOT
[facility] Emergency Department  [address]  [facility] [address]  Phone:  [phone]  Fax:  [phone]                                    Neo Marcos   MRN: [id number]    Department:  [facility] Emergency Department   Date of Visit:  4/5/2019           After Visit Summary Signature Page    I have received my discharge instructions, and my questions have been answered. I have discussed any challenges I see with this plan with the nurse or doctor.    ..........................................................................................................................................  Patient/Patient Representative Signature      ..........................................................................................................................................  Patient Representative Print Name and Relationship to Patient    ..................................................               ................................................  Date                                   Time    ..........................................................................................................................................  Reviewed by Signature/Title    ...................................................              ..............................................  Date                                               Time          22EPIC Rev 08/18

## 2019-04-06 NOTE — ED TRIAGE NOTES
Pt in with C/O skin tear to the RUE. Pt reports he was seen at [facility] last night d/t same wound however the wound continues to eventually bleed through the dressing. Pt is on xeralto

## 2019-04-06 NOTE — ED PROVIDER NOTES
History     Chief Complaint:  Wound Check      HPI   Neo Marcos is a 87 year old male with a history of atrial fibrillation on xarelto who presents for a wound check. The patient sustained two skin tears to his right upper extremity 3 days ago. He was seen at [facility] yesterday and had steri strips applied to one of the tears. However, the patient has continued to have oozing from the other skin tear. He denies any tingling or numbness to the arm.      Allergies:  No known drug allergies    Medications:    Amlodipine  Lisinopril  Atorvastatin  Proscar  Xarelto  Metoprolol    Past Medical History:    Atrial fibrillation  Hypertension  Alcohol withdrawal  Ischemic cardiomyopathy  Chronic systolic congestive heart failure  Squamous cell carcinoma of skin  Benign prostatic hyperplasia  Prostate cancer  Hyperlipidemia  Carotid artery stenosis    Past Surgical History:    Carotid endarterectomy, right  Cataract removal, bilateral  Inguinal hernia repair  Femoral-popliteal bypass graft, right    Family History:    Heart disease  Hypertension  Cancer, unspecified    Social History:  Smoking status: Former smoker, quit 5/20/2005  Alcohol use: Yes, occasionally  Drug use: No  PCP: Son Jose Turner  Presents to the ED with daughter  Marital Status:      Review of Systems   Skin: Positive for wound.   Neurological: Negative for numbness.   All other systems reviewed and are negative.      Physical Exam     Patient Vitals for the past 24 hrs:   BP Temp Temp src Pulse Resp SpO2 Weight   04/05/19 2159 153/77 98.1  F (36.7  C) Temporal 67 16 97 % 63.5 kg (140 lb)     Physical Exam  Constitutional: well appearing, no acute distress.   Neck: Normal ROM.  Cardiovascular: Normal rate  Respiratory: Effort normal. No respiratory distress.   Musculoskeletal: No deformities appreciated. Normal ROM. No edema noted. No bony tenderness to right forearm.   Neurological: Alert and Oriented x 3. Speech normal. Moves all  extremities equally.    Psychiatric: Appropriate mood, affect, and behavior.   Skin: Skin is warm and dry. V-shaped skin tear noted to right forearm, oozing a small amount of blood. No surrounding erythema. No purulent discharge.         Emergency Department Course   Emergency Department Course:  Nursing notes and vitals reviewed.  (2229) I performed an exam of the patient as documented above.    Findings and plan explained to the patient. Patient discharged home with instructions regarding supportive care, medications, and reasons to return. The importance of close follow-up was reviewed.         Impression & Plan      Medical Decision Makin year old male presenting with bleeding skin tear to right forearm. He is anticoagulated with xarelto. Vital signs normal on arrival aside from mild hypertension. No evidence of infection at this time. Skin tear was anesthetized with lidocaine with epinephrine, which stopped the oozing. The wound was cleaned. Steri strips were applied. Patient was observed briefly and had no further bleeding from the wound. Patient will be discharged home and will follow-up with primary care in 2-3 days for a wound check.     Diagnosis:    ICD-10-CM    1. Skin tear of forearm without complication, initial encounter S51.819A        Disposition: discharged to home        IPham, am serving as a scribe on 2019 at 10:29 PM to personally document services performed by Sandhya Arroyo PA-C based on my observations and the provider's statements to me.     2019   [facility] EMERGENCY DEPARTMENT     Sandhya Arroyo PA-C  [phone]

## 2020-01-01 ENCOUNTER — APPOINTMENT (OUTPATIENT)
Dept: CARDIOLOGY | Facility: CLINIC | Age: 85
DRG: 283 | End: 2020-01-01
Attending: HOSPITALIST
Payer: MEDICARE

## 2020-01-01 ENCOUNTER — HOSPITAL ENCOUNTER (INPATIENT)
Facility: CLINIC | Age: 85
LOS: 4 days | DRG: 283 | End: 2020-06-21
Attending: EMERGENCY MEDICINE | Admitting: HOSPITALIST
Payer: MEDICARE

## 2020-01-01 ENCOUNTER — APPOINTMENT (OUTPATIENT)
Dept: GENERAL RADIOLOGY | Facility: CLINIC | Age: 85
DRG: 283 | End: 2020-01-01
Attending: EMERGENCY MEDICINE
Payer: MEDICARE

## 2020-01-01 ENCOUNTER — APPOINTMENT (OUTPATIENT)
Dept: GENERAL RADIOLOGY | Facility: CLINIC | Age: 85
DRG: 283 | End: 2020-01-01
Attending: NURSE PRACTITIONER
Payer: MEDICARE

## 2020-01-01 VITALS
OXYGEN SATURATION: 86 % | DIASTOLIC BLOOD PRESSURE: 67 MMHG | RESPIRATION RATE: 32 BRPM | TEMPERATURE: 95.7 F | BODY MASS INDEX: 26.44 KG/M2 | HEART RATE: 87 BPM | SYSTOLIC BLOOD PRESSURE: 115 MMHG | HEIGHT: 65 IN | WEIGHT: 158.7 LBS

## 2020-01-01 DIAGNOSIS — J18.9 PNEUMONIA OF RIGHT LOWER LOBE DUE TO INFECTIOUS ORGANISM: ICD-10-CM

## 2020-01-01 DIAGNOSIS — J96.01 ACUTE RESPIRATORY FAILURE WITH HYPOXIA (H): Primary | ICD-10-CM

## 2020-01-01 LAB
ALBUMIN SERPL-MCNC: 3.1 G/DL (ref 3.4–5)
ALBUMIN SERPL-MCNC: 3.3 G/DL (ref 3.4–5)
ALP SERPL-CCNC: 70 U/L (ref 40–150)
ALP SERPL-CCNC: 71 U/L (ref 40–150)
ALT SERPL W P-5'-P-CCNC: 22 U/L (ref 0–70)
ALT SERPL W P-5'-P-CCNC: 23 U/L (ref 0–70)
ANION GAP SERPL CALCULATED.3IONS-SCNC: 10 MMOL/L (ref 3–14)
ANION GAP SERPL CALCULATED.3IONS-SCNC: 11 MMOL/L (ref 3–14)
ANION GAP SERPL CALCULATED.3IONS-SCNC: 7 MMOL/L (ref 3–14)
AST SERPL W P-5'-P-CCNC: 19 U/L (ref 0–45)
AST SERPL W P-5'-P-CCNC: 50 U/L (ref 0–45)
BACTERIA SPEC CULT: ABNORMAL
BASE DEFICIT BLDV-SCNC: 4.8 MMOL/L
BASOPHILS # BLD AUTO: 0 10E9/L (ref 0–0.2)
BASOPHILS # BLD AUTO: 0 10E9/L (ref 0–0.2)
BASOPHILS NFR BLD AUTO: 0.1 %
BASOPHILS NFR BLD AUTO: 0.5 %
BILIRUB SERPL-MCNC: 0.6 MG/DL (ref 0.2–1.3)
BILIRUB SERPL-MCNC: 0.8 MG/DL (ref 0.2–1.3)
BUN SERPL-MCNC: 22 MG/DL (ref 7–30)
BUN SERPL-MCNC: 22 MG/DL (ref 7–30)
BUN SERPL-MCNC: 30 MG/DL (ref 7–30)
CALCIUM SERPL-MCNC: 8.4 MG/DL (ref 8.5–10.1)
CALCIUM SERPL-MCNC: 8.6 MG/DL (ref 8.5–10.1)
CALCIUM SERPL-MCNC: 9 MG/DL (ref 8.5–10.1)
CHLORIDE SERPL-SCNC: 107 MMOL/L (ref 94–109)
CHLORIDE SERPL-SCNC: 109 MMOL/L (ref 94–109)
CHLORIDE SERPL-SCNC: 110 MMOL/L (ref 94–109)
CO2 SERPL-SCNC: 18 MMOL/L (ref 20–32)
CO2 SERPL-SCNC: 18 MMOL/L (ref 20–32)
CO2 SERPL-SCNC: 21 MMOL/L (ref 20–32)
CREAT SERPL-MCNC: 1.4 MG/DL (ref 0.66–1.25)
CREAT SERPL-MCNC: 1.54 MG/DL (ref 0.66–1.25)
CREAT SERPL-MCNC: 1.65 MG/DL (ref 0.66–1.25)
DIFFERENTIAL METHOD BLD: ABNORMAL
DIFFERENTIAL METHOD BLD: ABNORMAL
EOSINOPHIL # BLD AUTO: 0 10E9/L (ref 0–0.7)
EOSINOPHIL # BLD AUTO: 0.1 10E9/L (ref 0–0.7)
EOSINOPHIL NFR BLD AUTO: 0 %
EOSINOPHIL NFR BLD AUTO: 1.7 %
ERYTHROCYTE [DISTWIDTH] IN BLOOD BY AUTOMATED COUNT: 16 % (ref 10–15)
ERYTHROCYTE [DISTWIDTH] IN BLOOD BY AUTOMATED COUNT: 16.2 % (ref 10–15)
ERYTHROCYTE [DISTWIDTH] IN BLOOD BY AUTOMATED COUNT: 16.4 % (ref 10–15)
ETHANOL SERPL-MCNC: <0.01 G/DL
GFR SERPL CREATININE-BSD FRML MDRD: 36 ML/MIN/{1.73_M2}
GFR SERPL CREATININE-BSD FRML MDRD: 39 ML/MIN/{1.73_M2}
GFR SERPL CREATININE-BSD FRML MDRD: 44 ML/MIN/{1.73_M2}
GLUCOSE SERPL-MCNC: 139 MG/DL (ref 70–99)
GLUCOSE SERPL-MCNC: 162 MG/DL (ref 70–99)
GLUCOSE SERPL-MCNC: 189 MG/DL (ref 70–99)
HCO3 BLDV-SCNC: 20 MMOL/L (ref 21–28)
HCT VFR BLD AUTO: 32.6 % (ref 40–53)
HCT VFR BLD AUTO: 35.2 % (ref 40–53)
HCT VFR BLD AUTO: 35.4 % (ref 40–53)
HGB BLD-MCNC: 10.5 G/DL (ref 13.3–17.7)
HGB BLD-MCNC: 11.4 G/DL (ref 13.3–17.7)
HGB BLD-MCNC: 11.5 G/DL (ref 13.3–17.7)
IMM GRANULOCYTES # BLD: 0 10E9/L (ref 0–0.4)
IMM GRANULOCYTES # BLD: 0.1 10E9/L (ref 0–0.4)
IMM GRANULOCYTES NFR BLD: 0.2 %
IMM GRANULOCYTES NFR BLD: 0.4 %
INTERPRETATION ECG - MUSE: NORMAL
INTERPRETATION ECG - MUSE: NORMAL
LACTATE BLD-SCNC: 0.6 MMOL/L (ref 0.7–2)
LACTATE BLD-SCNC: 1.9 MMOL/L (ref 0.7–2)
LACTATE BLD-SCNC: 4 MMOL/L (ref 0.7–2)
LYMPHOCYTES # BLD AUTO: 0.4 10E9/L (ref 0.8–5.3)
LYMPHOCYTES # BLD AUTO: 1 10E9/L (ref 0.8–5.3)
LYMPHOCYTES NFR BLD AUTO: 16.2 %
LYMPHOCYTES NFR BLD AUTO: 2.9 %
MCH RBC QN AUTO: 28.8 PG (ref 26.5–33)
MCH RBC QN AUTO: 28.9 PG (ref 26.5–33)
MCH RBC QN AUTO: 29.3 PG (ref 26.5–33)
MCHC RBC AUTO-ENTMCNC: 32.2 G/DL (ref 31.5–36.5)
MCHC RBC AUTO-ENTMCNC: 32.2 G/DL (ref 31.5–36.5)
MCHC RBC AUTO-ENTMCNC: 32.7 G/DL (ref 31.5–36.5)
MCV RBC AUTO: 89 FL (ref 78–100)
MCV RBC AUTO: 90 FL (ref 78–100)
MCV RBC AUTO: 90 FL (ref 78–100)
MONOCYTES # BLD AUTO: 0.6 10E9/L (ref 0–1.3)
MONOCYTES # BLD AUTO: 0.9 10E9/L (ref 0–1.3)
MONOCYTES NFR BLD AUTO: 6.1 %
MONOCYTES NFR BLD AUTO: 8.9 %
NEUTROPHILS # BLD AUTO: 13 10E9/L (ref 1.6–8.3)
NEUTROPHILS # BLD AUTO: 4.6 10E9/L (ref 1.6–8.3)
NEUTROPHILS NFR BLD AUTO: 72.5 %
NEUTROPHILS NFR BLD AUTO: 90.5 %
NRBC # BLD AUTO: 0 10*3/UL
NRBC # BLD AUTO: 0 10*3/UL
NRBC BLD AUTO-RTO: 0 /100
NRBC BLD AUTO-RTO: 0 /100
NT-PROBNP SERPL-MCNC: 5084 PG/ML (ref 0–1800)
NT-PROBNP SERPL-MCNC: ABNORMAL PG/ML (ref 0–1800)
O2/TOTAL GAS SETTING VFR VENT: 93 %
OXYHGB MFR BLDV: 89 %
PCO2 BLDV: 34 MM HG (ref 40–50)
PH BLDV: 7.37 PH (ref 7.32–7.43)
PLATELET # BLD AUTO: 256 10E9/L (ref 150–450)
PLATELET # BLD AUTO: 263 10E9/L (ref 150–450)
PLATELET # BLD AUTO: 314 10E9/L (ref 150–450)
PO2 BLDV: 61 MM HG (ref 25–47)
POTASSIUM SERPL-SCNC: 3.9 MMOL/L (ref 3.4–5.3)
POTASSIUM SERPL-SCNC: 4.1 MMOL/L (ref 3.4–5.3)
POTASSIUM SERPL-SCNC: 4.5 MMOL/L (ref 3.4–5.3)
PROCALCITONIN SERPL-MCNC: <0.05 NG/ML
PROCALCITONIN SERPL-MCNC: <0.05 NG/ML
PROT SERPL-MCNC: 6.3 G/DL (ref 6.8–8.8)
PROT SERPL-MCNC: 6.9 G/DL (ref 6.8–8.8)
RBC # BLD AUTO: 3.65 10E12/L (ref 4.4–5.9)
RBC # BLD AUTO: 3.93 10E12/L (ref 4.4–5.9)
RBC # BLD AUTO: 3.94 10E12/L (ref 4.4–5.9)
SARS-COV-2 PCR COMMENT: NORMAL
SARS-COV-2 RNA SPEC QL NAA+PROBE: NEGATIVE
SARS-COV-2 RNA SPEC QL NAA+PROBE: NORMAL
SODIUM SERPL-SCNC: 136 MMOL/L (ref 133–144)
SODIUM SERPL-SCNC: 137 MMOL/L (ref 133–144)
SODIUM SERPL-SCNC: 138 MMOL/L (ref 133–144)
SPECIMEN SOURCE: ABNORMAL
SPECIMEN SOURCE: NORMAL
SPECIMEN SOURCE: NORMAL
TROPONIN I SERPL-MCNC: 0.05 UG/L (ref 0–0.04)
TROPONIN I SERPL-MCNC: 0.09 UG/L (ref 0–0.04)
TROPONIN I SERPL-MCNC: 0.1 UG/L (ref 0–0.04)
TROPONIN I SERPL-MCNC: 0.43 UG/L (ref 0–0.04)
TROPONIN I SERPL-MCNC: 4.66 UG/L (ref 0–0.04)
WBC # BLD AUTO: 14.4 10E9/L (ref 4–11)
WBC # BLD AUTO: 5.9 10E9/L (ref 4–11)
WBC # BLD AUTO: 6.4 10E9/L (ref 4–11)

## 2020-01-01 PROCEDURE — 40000275 ZZH STATISTIC RCP TIME EA 10 MIN

## 2020-01-01 PROCEDURE — 82805 BLOOD GASES W/O2 SATURATION: CPT | Performed by: EMERGENCY MEDICINE

## 2020-01-01 PROCEDURE — 99232 SBSQ HOSP IP/OBS MODERATE 35: CPT | Performed by: NURSE PRACTITIONER

## 2020-01-01 PROCEDURE — 99233 SBSQ HOSP IP/OBS HIGH 50: CPT | Performed by: INTERNAL MEDICINE

## 2020-01-01 PROCEDURE — 99238 HOSP IP/OBS DSCHRG MGMT 30/<: CPT | Performed by: NURSE PRACTITIONER

## 2020-01-01 PROCEDURE — 25800030 ZZH RX IP 258 OP 636: Performed by: HOSPITALIST

## 2020-01-01 PROCEDURE — 84484 ASSAY OF TROPONIN QUANT: CPT | Performed by: NURSE PRACTITIONER

## 2020-01-01 PROCEDURE — 99207 ZZC MOONLIGHTING INDICATOR: CPT | Performed by: INTERNAL MEDICINE

## 2020-01-01 PROCEDURE — 25000128 H RX IP 250 OP 636: Performed by: INTERNAL MEDICINE

## 2020-01-01 PROCEDURE — 25000125 ZZHC RX 250: Performed by: HOSPITALIST

## 2020-01-01 PROCEDURE — 25000128 H RX IP 250 OP 636: Performed by: NURSE PRACTITIONER

## 2020-01-01 PROCEDURE — 25000132 ZZH RX MED GY IP 250 OP 250 PS 637: Mod: GY | Performed by: HOSPITALIST

## 2020-01-01 PROCEDURE — 25000128 H RX IP 250 OP 636: Performed by: HOSPITALIST

## 2020-01-01 PROCEDURE — 25000125 ZZHC RX 250: Performed by: NURSE PRACTITIONER

## 2020-01-01 PROCEDURE — 84484 ASSAY OF TROPONIN QUANT: CPT | Performed by: EMERGENCY MEDICINE

## 2020-01-01 PROCEDURE — 25000125 ZZHC RX 250: Performed by: INTERNAL MEDICINE

## 2020-01-01 PROCEDURE — 83605 ASSAY OF LACTIC ACID: CPT | Performed by: NURSE PRACTITIONER

## 2020-01-01 PROCEDURE — 83605 ASSAY OF LACTIC ACID: CPT | Performed by: HOSPITALIST

## 2020-01-01 PROCEDURE — 84145 PROCALCITONIN (PCT): CPT | Performed by: EMERGENCY MEDICINE

## 2020-01-01 PROCEDURE — 93005 ELECTROCARDIOGRAM TRACING: CPT

## 2020-01-01 PROCEDURE — 85025 COMPLETE CBC W/AUTO DIFF WBC: CPT | Performed by: INTERNAL MEDICINE

## 2020-01-01 PROCEDURE — 40000264 ECHOCARDIOGRAM COMPLETE

## 2020-01-01 PROCEDURE — 25000128 H RX IP 250 OP 636

## 2020-01-01 PROCEDURE — 80053 COMPREHEN METABOLIC PANEL: CPT | Performed by: INTERNAL MEDICINE

## 2020-01-01 PROCEDURE — 25000132 ZZH RX MED GY IP 250 OP 250 PS 637: Mod: GY | Performed by: INTERNAL MEDICINE

## 2020-01-01 PROCEDURE — 87800 DETECT AGNT MULT DNA DIREC: CPT | Performed by: EMERGENCY MEDICINE

## 2020-01-01 PROCEDURE — 12000000 ZZH R&B MED SURG/OB

## 2020-01-01 PROCEDURE — 93306 TTE W/DOPPLER COMPLETE: CPT | Mod: 26 | Performed by: INTERNAL MEDICINE

## 2020-01-01 PROCEDURE — C9803 HOPD COVID-19 SPEC COLLECT: HCPCS

## 2020-01-01 PROCEDURE — 99207 ZZC CDG-MDM COMPONENT: MEETS MODERATE - UP CODED: CPT | Performed by: NURSE PRACTITIONER

## 2020-01-01 PROCEDURE — 94660 CPAP INITIATION&MGMT: CPT

## 2020-01-01 PROCEDURE — 87040 BLOOD CULTURE FOR BACTERIA: CPT | Performed by: HOSPITALIST

## 2020-01-01 PROCEDURE — 99233 SBSQ HOSP IP/OBS HIGH 50: CPT | Mod: 25 | Performed by: HOSPITALIST

## 2020-01-01 PROCEDURE — 25500064 ZZH RX 255 OP 636: Performed by: HOSPITALIST

## 2020-01-01 PROCEDURE — 25800030 ZZH RX IP 258 OP 636

## 2020-01-01 PROCEDURE — 87040 BLOOD CULTURE FOR BACTERIA: CPT | Performed by: EMERGENCY MEDICINE

## 2020-01-01 PROCEDURE — 80053 COMPREHEN METABOLIC PANEL: CPT | Performed by: HOSPITALIST

## 2020-01-01 PROCEDURE — 99356 ZZC PROLONGED SERV,INPATIENT,1ST HR: CPT | Performed by: HOSPITALIST

## 2020-01-01 PROCEDURE — 87077 CULTURE AEROBIC IDENTIFY: CPT | Performed by: EMERGENCY MEDICINE

## 2020-01-01 PROCEDURE — 99232 SBSQ HOSP IP/OBS MODERATE 35: CPT | Mod: 25 | Performed by: INTERNAL MEDICINE

## 2020-01-01 PROCEDURE — 12000011 ZZH R&B MS OVERFLOW

## 2020-01-01 PROCEDURE — 83880 ASSAY OF NATRIURETIC PEPTIDE: CPT | Performed by: EMERGENCY MEDICINE

## 2020-01-01 PROCEDURE — 96365 THER/PROPH/DIAG IV INF INIT: CPT

## 2020-01-01 PROCEDURE — 83880 ASSAY OF NATRIURETIC PEPTIDE: CPT | Performed by: INTERNAL MEDICINE

## 2020-01-01 PROCEDURE — 99223 1ST HOSP IP/OBS HIGH 75: CPT | Mod: AI | Performed by: HOSPITALIST

## 2020-01-01 PROCEDURE — 99207 ZZC APP CREDIT; MD BILLING SHARED VISIT: CPT | Performed by: INTERNAL MEDICINE

## 2020-01-01 PROCEDURE — 80048 BASIC METABOLIC PNL TOTAL CA: CPT | Performed by: EMERGENCY MEDICINE

## 2020-01-01 PROCEDURE — 25000125 ZZHC RX 250: Performed by: EMERGENCY MEDICINE

## 2020-01-01 PROCEDURE — 84145 PROCALCITONIN (PCT): CPT | Performed by: NURSE PRACTITIONER

## 2020-01-01 PROCEDURE — 96375 TX/PRO/DX INJ NEW DRUG ADDON: CPT

## 2020-01-01 PROCEDURE — 94640 AIRWAY INHALATION TREATMENT: CPT

## 2020-01-01 PROCEDURE — 71045 X-RAY EXAM CHEST 1 VIEW: CPT

## 2020-01-01 PROCEDURE — 99207 ZZC NON-BILLABLE SERV PER CHARTING: CPT | Performed by: PHYSICIAN ASSISTANT

## 2020-01-01 PROCEDURE — U0003 INFECTIOUS AGENT DETECTION BY NUCLEIC ACID (DNA OR RNA); SEVERE ACUTE RESPIRATORY SYNDROME CORONAVIRUS 2 (SARS-COV-2) (CORONAVIRUS DISEASE [COVID-19]), AMPLIFIED PROBE TECHNIQUE, MAKING USE OF HIGH THROUGHPUT TECHNOLOGIES AS DESCRIBED BY CMS-2020-01-R: HCPCS | Performed by: EMERGENCY MEDICINE

## 2020-01-01 PROCEDURE — 85027 COMPLETE CBC AUTOMATED: CPT | Performed by: HOSPITALIST

## 2020-01-01 PROCEDURE — 84484 ASSAY OF TROPONIN QUANT: CPT | Performed by: HOSPITALIST

## 2020-01-01 PROCEDURE — 85025 COMPLETE CBC W/AUTO DIFF WBC: CPT | Performed by: EMERGENCY MEDICINE

## 2020-01-01 PROCEDURE — 87186 SC STD MICRODIL/AGAR DIL: CPT | Performed by: EMERGENCY MEDICINE

## 2020-01-01 PROCEDURE — 80320 DRUG SCREEN QUANTALCOHOLS: CPT | Performed by: EMERGENCY MEDICINE

## 2020-01-01 PROCEDURE — 99291 CRITICAL CARE FIRST HOUR: CPT | Mod: 25

## 2020-01-01 PROCEDURE — 25000128 H RX IP 250 OP 636: Performed by: EMERGENCY MEDICINE

## 2020-01-01 PROCEDURE — 84484 ASSAY OF TROPONIN QUANT: CPT | Performed by: INTERNAL MEDICINE

## 2020-01-01 RX ORDER — PIPERACILLIN SODIUM, TAZOBACTAM SODIUM 3; .375 G/15ML; G/15ML
3.38 INJECTION, POWDER, LYOPHILIZED, FOR SOLUTION INTRAVENOUS EVERY 6 HOURS
Status: DISCONTINUED | OUTPATIENT
Start: 2020-01-01 | End: 2020-01-01

## 2020-01-01 RX ORDER — ASPIRIN 81 MG/1
81 TABLET, CHEWABLE ORAL DAILY
Status: DISCONTINUED | OUTPATIENT
Start: 2020-01-01 | End: 2020-01-01

## 2020-01-01 RX ORDER — ISOSORBIDE MONONITRATE 30 MG/1
120 TABLET, EXTENDED RELEASE ORAL EVERY MORNING
Status: DISCONTINUED | OUTPATIENT
Start: 2020-01-01 | End: 2020-06-22 | Stop reason: HOSPADM

## 2020-01-01 RX ORDER — SODIUM CHLORIDE 9 MG/ML
INJECTION, SOLUTION INTRAVENOUS CONTINUOUS
Status: DISCONTINUED | OUTPATIENT
Start: 2020-01-01 | End: 2020-01-01

## 2020-01-01 RX ORDER — ASPIRIN 81 MG/1
81 TABLET, CHEWABLE ORAL DAILY
COMMUNITY

## 2020-01-01 RX ORDER — MIRTAZAPINE 15 MG/1
15 TABLET, FILM COATED ORAL AT BEDTIME
COMMUNITY

## 2020-01-01 RX ORDER — LABETALOL HYDROCHLORIDE 5 MG/ML
10 INJECTION, SOLUTION INTRAVENOUS
Status: DISCONTINUED | OUTPATIENT
Start: 2020-01-01 | End: 2020-01-01

## 2020-01-01 RX ORDER — MORPHINE SULFATE 100 MG/5ML
5-10 SOLUTION ORAL
Status: DISCONTINUED | OUTPATIENT
Start: 2020-01-01 | End: 2020-06-22 | Stop reason: HOSPADM

## 2020-01-01 RX ORDER — MINERAL OIL/HYDROPHIL PETROLAT
OINTMENT (GRAM) TOPICAL EVERY 8 HOURS PRN
Status: DISCONTINUED | OUTPATIENT
Start: 2020-01-01 | End: 2020-06-22 | Stop reason: HOSPADM

## 2020-01-01 RX ORDER — GLYCOPYRROLATE 0.2 MG/ML
.2-.4 INJECTION, SOLUTION INTRAMUSCULAR; INTRAVENOUS EVERY 4 HOURS PRN
Status: DISCONTINUED | OUTPATIENT
Start: 2020-01-01 | End: 2020-06-22 | Stop reason: HOSPADM

## 2020-01-01 RX ORDER — HALOPERIDOL 5 MG/ML
.5-1 INJECTION INTRAMUSCULAR
Status: DISCONTINUED | OUTPATIENT
Start: 2020-01-01 | End: 2020-06-22 | Stop reason: HOSPADM

## 2020-01-01 RX ORDER — MORPHINE SULFATE 2 MG/ML
1-2 INJECTION, SOLUTION INTRAMUSCULAR; INTRAVENOUS
Status: DISCONTINUED | OUTPATIENT
Start: 2020-01-01 | End: 2020-06-22 | Stop reason: HOSPADM

## 2020-01-01 RX ORDER — OMEGA-3/DHA/EPA/FISH OIL 60 MG-90MG
500 CAPSULE ORAL DAILY
COMMUNITY

## 2020-01-01 RX ORDER — MORPHINE SULFATE 100 MG/5ML
5-10 SOLUTION ORAL
Status: DISCONTINUED | OUTPATIENT
Start: 2020-01-01 | End: 2020-01-01

## 2020-01-01 RX ORDER — CARBOXYMETHYLCELLULOSE SODIUM 5 MG/ML
1 SOLUTION/ DROPS OPHTHALMIC
Status: DISCONTINUED | OUTPATIENT
Start: 2020-01-01 | End: 2020-06-22 | Stop reason: HOSPADM

## 2020-01-01 RX ORDER — AMLODIPINE BESYLATE 5 MG/1
10 TABLET ORAL AT BEDTIME
Status: DISCONTINUED | OUTPATIENT
Start: 2020-01-01 | End: 2020-01-01

## 2020-01-01 RX ORDER — POLYETHYLENE GLYCOL 3350 17 G/17G
17 POWDER, FOR SOLUTION ORAL DAILY PRN
Status: DISCONTINUED | OUTPATIENT
Start: 2020-01-01 | End: 2020-06-22 | Stop reason: HOSPADM

## 2020-01-01 RX ORDER — FUROSEMIDE 10 MG/ML
20 INJECTION INTRAMUSCULAR; INTRAVENOUS 2 TIMES DAILY PRN
Status: DISCONTINUED | OUTPATIENT
Start: 2020-01-01 | End: 2020-06-22 | Stop reason: HOSPADM

## 2020-01-01 RX ORDER — FUROSEMIDE 10 MG/ML
20 INJECTION INTRAMUSCULAR; INTRAVENOUS ONCE
Status: COMPLETED | OUTPATIENT
Start: 2020-01-01 | End: 2020-01-01

## 2020-01-01 RX ORDER — VIT C/E/ZN/COPPR/LUTEIN/ZEAXAN 60 MG-6 MG
1 CAPSULE ORAL 2 TIMES DAILY
Status: DISCONTINUED | OUTPATIENT
Start: 2020-01-01 | End: 2020-01-01

## 2020-01-01 RX ORDER — FUROSEMIDE 10 MG/ML
20 INJECTION INTRAMUSCULAR; INTRAVENOUS 2 TIMES DAILY
Status: DISCONTINUED | OUTPATIENT
Start: 2020-01-01 | End: 2020-01-01

## 2020-01-01 RX ORDER — ATORVASTATIN CALCIUM 40 MG/1
40 TABLET, FILM COATED ORAL AT BEDTIME
Status: DISCONTINUED | OUTPATIENT
Start: 2020-01-01 | End: 2020-01-01

## 2020-01-01 RX ORDER — LANOLIN ALCOHOL/MO/W.PET/CERES
100 CREAM (GRAM) TOPICAL DAILY
COMMUNITY

## 2020-01-01 RX ORDER — NALOXONE HYDROCHLORIDE 0.4 MG/ML
.1-.4 INJECTION, SOLUTION INTRAMUSCULAR; INTRAVENOUS; SUBCUTANEOUS
Status: DISCONTINUED | OUTPATIENT
Start: 2020-01-01 | End: 2020-06-22 | Stop reason: HOSPADM

## 2020-01-01 RX ORDER — ONDANSETRON 4 MG/1
4 TABLET, ORALLY DISINTEGRATING ORAL EVERY 6 HOURS PRN
Status: DISCONTINUED | OUTPATIENT
Start: 2020-01-01 | End: 2020-06-22 | Stop reason: HOSPADM

## 2020-01-01 RX ORDER — METOPROLOL SUCCINATE 25 MG/1
25 TABLET, EXTENDED RELEASE ORAL DAILY
COMMUNITY

## 2020-01-01 RX ORDER — METOPROLOL TARTRATE 1 MG/ML
2.5 INJECTION, SOLUTION INTRAVENOUS ONCE
Status: COMPLETED | OUTPATIENT
Start: 2020-01-01 | End: 2020-01-01

## 2020-01-01 RX ORDER — ACETAMINOPHEN 500 MG
500-1000 TABLET ORAL AT BEDTIME
COMMUNITY

## 2020-01-01 RX ORDER — AMOXICILLIN 250 MG
1 CAPSULE ORAL 2 TIMES DAILY PRN
Status: DISCONTINUED | OUTPATIENT
Start: 2020-01-01 | End: 2020-06-22 | Stop reason: HOSPADM

## 2020-01-01 RX ORDER — ISOSORBIDE MONONITRATE 120 MG/1
120 TABLET, EXTENDED RELEASE ORAL EVERY MORNING
COMMUNITY

## 2020-01-01 RX ORDER — FINASTERIDE 5 MG/1
5 TABLET, FILM COATED ORAL DAILY
Status: DISCONTINUED | OUTPATIENT
Start: 2020-01-01 | End: 2020-01-01

## 2020-01-01 RX ORDER — FOLIC ACID 1 MG/1
1 TABLET ORAL DAILY
Status: DISCONTINUED | OUTPATIENT
Start: 2020-01-01 | End: 2020-01-01

## 2020-01-01 RX ORDER — LORAZEPAM 0.5 MG/1
.5-1 TABLET ORAL
Status: DISCONTINUED | OUTPATIENT
Start: 2020-01-01 | End: 2020-01-01

## 2020-01-01 RX ORDER — ACETAMINOPHEN 325 MG/1
650 TABLET ORAL EVERY 6 HOURS PRN
COMMUNITY

## 2020-01-01 RX ORDER — POLYETHYLENE GLYCOL 3350 17 G/17G
1 POWDER, FOR SOLUTION ORAL DAILY PRN
COMMUNITY

## 2020-01-01 RX ORDER — MORPHINE SULFATE 10 MG/5ML
5-10 SOLUTION ORAL
Status: DISCONTINUED | OUTPATIENT
Start: 2020-01-01 | End: 2020-06-22 | Stop reason: HOSPADM

## 2020-01-01 RX ORDER — FUROSEMIDE 10 MG/ML
INJECTION INTRAMUSCULAR; INTRAVENOUS
Status: DISCONTINUED
Start: 2020-01-01 | End: 2020-01-01 | Stop reason: HOSPADM

## 2020-01-01 RX ORDER — LIDOCAINE 40 MG/G
CREAM TOPICAL
Status: DISCONTINUED | OUTPATIENT
Start: 2020-01-01 | End: 2020-01-01

## 2020-01-01 RX ORDER — LORAZEPAM 2 MG/ML
.5-1 INJECTION INTRAMUSCULAR
Status: DISCONTINUED | OUTPATIENT
Start: 2020-01-01 | End: 2020-01-01

## 2020-01-01 RX ORDER — LORAZEPAM 2 MG/ML
.5-1 INJECTION INTRAMUSCULAR
Status: DISCONTINUED | OUTPATIENT
Start: 2020-01-01 | End: 2020-06-22 | Stop reason: HOSPADM

## 2020-01-01 RX ORDER — LISINOPRIL 20 MG/1
20 TABLET ORAL DAILY
Status: DISCONTINUED | OUTPATIENT
Start: 2020-01-01 | End: 2020-01-01

## 2020-01-01 RX ORDER — NALOXONE HYDROCHLORIDE 0.4 MG/ML
.1-.4 INJECTION, SOLUTION INTRAMUSCULAR; INTRAVENOUS; SUBCUTANEOUS
Status: DISCONTINUED | OUTPATIENT
Start: 2020-01-01 | End: 2020-01-01

## 2020-01-01 RX ORDER — POLYETHYLENE GLYCOL 3350 17 G/17G
17 POWDER, FOR SOLUTION ORAL DAILY PRN
Status: DISCONTINUED | OUTPATIENT
Start: 2020-01-01 | End: 2020-01-01

## 2020-01-01 RX ORDER — BISACODYL 10 MG
10 SUPPOSITORY, RECTAL RECTAL DAILY PRN
Status: DISCONTINUED | OUTPATIENT
Start: 2020-01-01 | End: 2020-06-22 | Stop reason: HOSPADM

## 2020-01-01 RX ORDER — NYSTATIN 100000 [USP'U]/G
1 POWDER TOPICAL 2 TIMES DAILY PRN
COMMUNITY

## 2020-01-01 RX ORDER — ACETAMINOPHEN 325 MG/1
650 TABLET ORAL EVERY 4 HOURS PRN
Status: DISCONTINUED | OUTPATIENT
Start: 2020-01-01 | End: 2020-06-22 | Stop reason: HOSPADM

## 2020-01-01 RX ORDER — METOPROLOL SUCCINATE 25 MG/1
25 TABLET, EXTENDED RELEASE ORAL 2 TIMES DAILY
Status: DISCONTINUED | OUTPATIENT
Start: 2020-01-01 | End: 2020-01-01

## 2020-01-01 RX ORDER — METHYLPREDNISOLONE SODIUM SUCCINATE 125 MG/2ML
125 INJECTION, POWDER, LYOPHILIZED, FOR SOLUTION INTRAMUSCULAR; INTRAVENOUS ONCE
Status: COMPLETED | OUTPATIENT
Start: 2020-01-01 | End: 2020-01-01

## 2020-01-01 RX ORDER — FOLIC ACID 1 MG/1
1 TABLET ORAL DAILY
COMMUNITY

## 2020-01-01 RX ORDER — ATROPINE SULFATE 10 MG/ML
1-2 SOLUTION/ DROPS OPHTHALMIC
Status: DISCONTINUED | OUTPATIENT
Start: 2020-01-01 | End: 2020-06-22 | Stop reason: HOSPADM

## 2020-01-01 RX ORDER — METHYLPREDNISOLONE SODIUM SUCCINATE 40 MG/ML
40 INJECTION, POWDER, LYOPHILIZED, FOR SOLUTION INTRAMUSCULAR; INTRAVENOUS EVERY 8 HOURS
Status: DISCONTINUED | OUTPATIENT
Start: 2020-01-01 | End: 2020-01-01

## 2020-01-01 RX ORDER — ACETAMINOPHEN 325 MG/1
650 TABLET ORAL EVERY 6 HOURS PRN
Status: DISCONTINUED | OUTPATIENT
Start: 2020-01-01 | End: 2020-01-01

## 2020-01-01 RX ORDER — LORAZEPAM 0.5 MG/1
.5-1 TABLET ORAL
Status: DISCONTINUED | OUTPATIENT
Start: 2020-01-01 | End: 2020-06-22 | Stop reason: HOSPADM

## 2020-01-01 RX ORDER — GLYCOPYRROLATE 1 MG/1
2-4 TABLET ORAL EVERY 4 HOURS PRN
Status: DISCONTINUED | OUTPATIENT
Start: 2020-01-01 | End: 2020-06-22 | Stop reason: HOSPADM

## 2020-01-01 RX ORDER — LANOLIN ALCOHOL/MO/W.PET/CERES
100 CREAM (GRAM) TOPICAL DAILY
Status: DISCONTINUED | OUTPATIENT
Start: 2020-01-01 | End: 2020-01-01

## 2020-01-01 RX ORDER — VIT C/E/ZN/COPPR/LUTEIN/ZEAXAN 60 MG-6 MG
CAPSULE ORAL 2 TIMES DAILY
Status: DISCONTINUED | OUTPATIENT
Start: 2020-01-01 | End: 2020-01-01

## 2020-01-01 RX ORDER — VITAMIN B COMPLEX
1000 TABLET ORAL DAILY
Status: DISCONTINUED | OUTPATIENT
Start: 2020-01-01 | End: 2020-01-01

## 2020-01-01 RX ORDER — MORPHINE SULFATE 10 MG/5ML
5-10 SOLUTION ORAL
Status: DISCONTINUED | OUTPATIENT
Start: 2020-01-01 | End: 2020-01-01

## 2020-01-01 RX ORDER — SALIVA STIMULANT COMB. NO.3
2 SPRAY, NON-AEROSOL (ML) MUCOUS MEMBRANE
Status: DISCONTINUED | OUTPATIENT
Start: 2020-01-01 | End: 2020-06-22 | Stop reason: HOSPADM

## 2020-01-01 RX ORDER — TRAZODONE HYDROCHLORIDE 50 MG/1
25 TABLET, FILM COATED ORAL AT BEDTIME
COMMUNITY

## 2020-01-01 RX ORDER — LIDOCAINE 40 MG/G
CREAM TOPICAL
Status: DISCONTINUED | OUTPATIENT
Start: 2020-01-01 | End: 2020-06-22 | Stop reason: HOSPADM

## 2020-01-01 RX ORDER — ACETAMINOPHEN 650 MG/1
650 SUPPOSITORY RECTAL EVERY 4 HOURS PRN
Status: DISCONTINUED | OUTPATIENT
Start: 2020-01-01 | End: 2020-06-22 | Stop reason: HOSPADM

## 2020-01-01 RX ORDER — MIRTAZAPINE 15 MG/1
15 TABLET, FILM COATED ORAL AT BEDTIME
Status: DISCONTINUED | OUTPATIENT
Start: 2020-01-01 | End: 2020-06-22 | Stop reason: HOSPADM

## 2020-01-01 RX ORDER — AMOXICILLIN 250 MG
2 CAPSULE ORAL 2 TIMES DAILY PRN
Status: DISCONTINUED | OUTPATIENT
Start: 2020-01-01 | End: 2020-06-22 | Stop reason: HOSPADM

## 2020-01-01 RX ORDER — ONDANSETRON 2 MG/ML
4 INJECTION INTRAMUSCULAR; INTRAVENOUS EVERY 6 HOURS PRN
Status: DISCONTINUED | OUTPATIENT
Start: 2020-01-01 | End: 2020-06-22 | Stop reason: HOSPADM

## 2020-01-01 RX ORDER — PIPERACILLIN SODIUM, TAZOBACTAM SODIUM 3; .375 G/15ML; G/15ML
3.38 INJECTION, POWDER, LYOPHILIZED, FOR SOLUTION INTRAVENOUS ONCE
Status: COMPLETED | OUTPATIENT
Start: 2020-01-01 | End: 2020-01-01

## 2020-01-01 RX ORDER — PIPERACILLIN SODIUM, TAZOBACTAM SODIUM 2; .25 G/10ML; G/10ML
2.25 INJECTION, POWDER, LYOPHILIZED, FOR SOLUTION INTRAVENOUS EVERY 6 HOURS
Status: DISCONTINUED | OUTPATIENT
Start: 2020-01-01 | End: 2020-01-01

## 2020-01-01 RX ORDER — NITROGLYCERIN 0.4 MG/1
0.4 TABLET SUBLINGUAL EVERY 5 MIN PRN
Status: DISCONTINUED | OUTPATIENT
Start: 2020-01-01 | End: 2020-06-22 | Stop reason: HOSPADM

## 2020-01-01 RX ORDER — MULTIPLE VITAMINS W/ MINERALS TAB 9MG-400MCG
1 TAB ORAL DAILY
Status: DISCONTINUED | OUTPATIENT
Start: 2020-01-01 | End: 2020-01-01

## 2020-01-01 RX ORDER — METOPROLOL SUCCINATE 25 MG/1
25 TABLET, EXTENDED RELEASE ORAL DAILY
Status: DISCONTINUED | OUTPATIENT
Start: 2020-01-01 | End: 2020-01-01

## 2020-01-01 RX ORDER — IPRATROPIUM BROMIDE AND ALBUTEROL SULFATE 2.5; .5 MG/3ML; MG/3ML
3 SOLUTION RESPIRATORY (INHALATION) ONCE
Status: COMPLETED | OUTPATIENT
Start: 2020-01-01 | End: 2020-01-01

## 2020-01-01 RX ORDER — METOPROLOL TARTRATE 1 MG/ML
2.5-5 INJECTION, SOLUTION INTRAVENOUS EVERY 4 HOURS PRN
Status: DISCONTINUED | OUTPATIENT
Start: 2020-01-01 | End: 2020-01-01

## 2020-01-01 RX ADMIN — ATROPINE SULFATE 2 DROP: 10 SOLUTION/ DROPS OPHTHALMIC at 18:49

## 2020-01-01 RX ADMIN — LORAZEPAM 1 MG: 2 INJECTION INTRAMUSCULAR; INTRAVENOUS at 00:45

## 2020-01-01 RX ADMIN — MORPHINE SULFATE 2 MG: 2 INJECTION, SOLUTION INTRAMUSCULAR; INTRAVENOUS at 20:13

## 2020-01-01 RX ADMIN — MIRTAZAPINE 15 MG: 15 TABLET, FILM COATED ORAL at 21:11

## 2020-01-01 RX ADMIN — METHYLPREDNISOLONE SODIUM SUCCINATE 40 MG: 40 INJECTION, POWDER, FOR SOLUTION INTRAMUSCULAR; INTRAVENOUS at 06:21

## 2020-01-01 RX ADMIN — MORPHINE SULFATE 10 MG: 100 SOLUTION ORAL at 23:47

## 2020-01-01 RX ADMIN — PIPERACILLIN SODIUM AND TAZOBACTAM SODIUM 3.38 G: 3; .375 INJECTION, POWDER, LYOPHILIZED, FOR SOLUTION INTRAVENOUS at 21:18

## 2020-01-01 RX ADMIN — FUROSEMIDE 20 MG: 10 INJECTION, SOLUTION INTRAVENOUS at 14:14

## 2020-01-01 RX ADMIN — LORAZEPAM 1 MG: 2 INJECTION INTRAMUSCULAR; INTRAVENOUS at 22:07

## 2020-01-01 RX ADMIN — PIPERACILLIN SODIUM AND TAZOBACTAM SODIUM 3.38 G: 3; .375 INJECTION, POWDER, LYOPHILIZED, FOR SOLUTION INTRAVENOUS at 15:16

## 2020-01-01 RX ADMIN — ATROPINE SULFATE 2 DROP: 10 SOLUTION/ DROPS OPHTHALMIC at 12:52

## 2020-01-01 RX ADMIN — LORAZEPAM 1 MG: 2 INJECTION INTRAMUSCULAR; INTRAVENOUS at 04:43

## 2020-01-01 RX ADMIN — LORAZEPAM 1 MG: 2 INJECTION INTRAMUSCULAR; INTRAVENOUS at 05:37

## 2020-01-01 RX ADMIN — MORPHINE SULFATE 10 MG: 100 SOLUTION ORAL at 03:46

## 2020-01-01 RX ADMIN — LORAZEPAM 1 MG: 2 INJECTION INTRAMUSCULAR; INTRAVENOUS at 18:07

## 2020-01-01 RX ADMIN — MORPHINE SULFATE 2 MG: 2 INJECTION, SOLUTION INTRAMUSCULAR; INTRAVENOUS at 17:52

## 2020-01-01 RX ADMIN — GLYCOPYRROLATE 0.4 MG: 0.2 INJECTION, SOLUTION INTRAMUSCULAR; INTRAVENOUS at 17:49

## 2020-01-01 RX ADMIN — MORPHINE SULFATE 10 MG: 100 SOLUTION ORAL at 01:48

## 2020-01-01 RX ADMIN — MORPHINE SULFATE 2 MG: 2 INJECTION, SOLUTION INTRAMUSCULAR; INTRAVENOUS at 09:32

## 2020-01-01 RX ADMIN — MORPHINE SULFATE 1 MG: 2 INJECTION, SOLUTION INTRAMUSCULAR; INTRAVENOUS at 12:57

## 2020-01-01 RX ADMIN — VANCOMYCIN HYDROCHLORIDE 1500 MG: 5 INJECTION, POWDER, LYOPHILIZED, FOR SOLUTION INTRAVENOUS at 21:52

## 2020-01-01 RX ADMIN — HUMAN ALBUMIN MICROSPHERES AND PERFLUTREN 9 ML: 10; .22 INJECTION, SOLUTION INTRAVENOUS at 10:39

## 2020-01-01 RX ADMIN — MORPHINE SULFATE 1 MG: 2 INJECTION, SOLUTION INTRAMUSCULAR; INTRAVENOUS at 01:28

## 2020-01-01 RX ADMIN — GLYCOPYRROLATE 0.4 MG: 0.2 INJECTION, SOLUTION INTRAMUSCULAR; INTRAVENOUS at 08:46

## 2020-01-01 RX ADMIN — MORPHINE SULFATE 2 MG: 2 INJECTION, SOLUTION INTRAMUSCULAR; INTRAVENOUS at 11:26

## 2020-01-01 RX ADMIN — FUROSEMIDE 20 MG: 10 INJECTION, SOLUTION INTRAVENOUS at 15:17

## 2020-01-01 RX ADMIN — MORPHINE SULFATE 10 MG: 100 SOLUTION ORAL at 14:12

## 2020-01-01 RX ADMIN — MORPHINE SULFATE 2 MG: 2 INJECTION, SOLUTION INTRAMUSCULAR; INTRAVENOUS at 18:46

## 2020-01-01 RX ADMIN — PIPERACILLIN SODIUM AND TAZOBACTAM SODIUM 3.38 G: 3; .375 INJECTION, POWDER, LYOPHILIZED, FOR SOLUTION INTRAVENOUS at 22:30

## 2020-01-01 RX ADMIN — METHYLPREDNISOLONE SODIUM SUCCINATE 40 MG: 40 INJECTION, POWDER, FOR SOLUTION INTRAMUSCULAR; INTRAVENOUS at 14:49

## 2020-01-01 RX ADMIN — MORPHINE SULFATE 10 MG: 100 SOLUTION ORAL at 17:49

## 2020-01-01 RX ADMIN — METHYLPREDNISOLONE SODIUM SUCCINATE 40 MG: 40 INJECTION, POWDER, FOR SOLUTION INTRAMUSCULAR; INTRAVENOUS at 21:18

## 2020-01-01 RX ADMIN — ATORVASTATIN CALCIUM 40 MG: 40 TABLET, FILM COATED ORAL at 21:18

## 2020-01-01 RX ADMIN — TRAZODONE HYDROCHLORIDE 25 MG: 50 TABLET ORAL at 21:18

## 2020-01-01 RX ADMIN — ATROPINE SULFATE 2 DROP: 10 SOLUTION/ DROPS OPHTHALMIC at 19:16

## 2020-01-01 RX ADMIN — MORPHINE SULFATE 10 MG: 100 SOLUTION ORAL at 11:46

## 2020-01-01 RX ADMIN — MORPHINE SULFATE 10 MG: 100 SOLUTION ORAL at 10:24

## 2020-01-01 RX ADMIN — MORPHINE SULFATE 2 MG: 2 INJECTION, SOLUTION INTRAMUSCULAR; INTRAVENOUS at 19:56

## 2020-01-01 RX ADMIN — LORAZEPAM 1 MG: 2 INJECTION INTRAMUSCULAR; INTRAVENOUS at 17:49

## 2020-01-01 RX ADMIN — MORPHINE SULFATE 10 MG: 100 SOLUTION ORAL at 15:23

## 2020-01-01 RX ADMIN — ATROPINE SULFATE 2 DROP: 10 SOLUTION/ DROPS OPHTHALMIC at 14:22

## 2020-01-01 RX ADMIN — MORPHINE SULFATE 10 MG: 100 SOLUTION ORAL at 05:50

## 2020-01-01 RX ADMIN — TRAZODONE HYDROCHLORIDE 25 MG: 50 TABLET ORAL at 21:11

## 2020-01-01 RX ADMIN — METHYLPREDNISOLONE SODIUM SUCCINATE 125 MG: 125 INJECTION, POWDER, FOR SOLUTION INTRAMUSCULAR; INTRAVENOUS at 21:35

## 2020-01-01 RX ADMIN — MORPHINE SULFATE 10 MG: 100 SOLUTION ORAL at 19:48

## 2020-01-01 RX ADMIN — MORPHINE SULFATE 1 MG: 2 INJECTION, SOLUTION INTRAMUSCULAR; INTRAVENOUS at 16:03

## 2020-01-01 RX ADMIN — IPRATROPIUM BROMIDE AND ALBUTEROL SULFATE 3 ML: .5; 3 SOLUTION RESPIRATORY (INHALATION) at 21:33

## 2020-01-01 RX ADMIN — HALOPERIDOL LACTATE 1 MG: 5 INJECTION, SOLUTION INTRAMUSCULAR at 19:24

## 2020-01-01 RX ADMIN — ATROPINE SULFATE 2 DROP: 10 SOLUTION/ DROPS OPHTHALMIC at 11:25

## 2020-01-01 RX ADMIN — MORPHINE SULFATE 10 MG: 100 SOLUTION ORAL at 12:51

## 2020-01-01 RX ADMIN — LORAZEPAM 1 MG: 2 INJECTION INTRAMUSCULAR; INTRAVENOUS at 20:44

## 2020-01-01 RX ADMIN — MORPHINE SULFATE 2 MG: 2 INJECTION, SOLUTION INTRAMUSCULAR; INTRAVENOUS at 05:06

## 2020-01-01 RX ADMIN — PIPERACILLIN SODIUM AND TAZOBACTAM SODIUM 2.25 G: 2; .25 INJECTION, POWDER, LYOPHILIZED, FOR SOLUTION INTRAVENOUS at 09:00

## 2020-01-01 RX ADMIN — MORPHINE SULFATE 10 MG: 100 SOLUTION ORAL at 19:15

## 2020-01-01 RX ADMIN — LORAZEPAM 0.5 MG: 2 INJECTION INTRAMUSCULAR; INTRAVENOUS at 14:57

## 2020-01-01 RX ADMIN — LORAZEPAM 1 MG: 2 INJECTION INTRAMUSCULAR; INTRAVENOUS at 08:46

## 2020-01-01 RX ADMIN — MORPHINE SULFATE 10 MG: 100 SOLUTION ORAL at 08:26

## 2020-01-01 RX ADMIN — MORPHINE SULFATE 10 MG: 100 SOLUTION ORAL at 16:42

## 2020-01-01 RX ADMIN — METOPROLOL TARTRATE 2.5 MG: 5 INJECTION INTRAVENOUS at 15:10

## 2020-01-01 RX ADMIN — METOPROLOL TARTRATE 5 MG: 5 INJECTION INTRAVENOUS at 15:16

## 2020-01-01 RX ADMIN — MIRTAZAPINE 15 MG: 15 TABLET, FILM COATED ORAL at 21:18

## 2020-01-01 RX ADMIN — MORPHINE SULFATE 10 MG: 100 SOLUTION ORAL at 21:53

## 2020-01-01 RX ADMIN — METHYLPREDNISOLONE SODIUM SUCCINATE 40 MG: 40 INJECTION, POWDER, FOR SOLUTION INTRAMUSCULAR; INTRAVENOUS at 06:34

## 2020-01-01 RX ADMIN — GLYCOPYRROLATE 0.4 MG: 0.2 INJECTION, SOLUTION INTRAMUSCULAR; INTRAVENOUS at 14:15

## 2020-01-01 RX ADMIN — AMLODIPINE BESYLATE 10 MG: 5 TABLET ORAL at 21:18

## 2020-01-01 RX ADMIN — Medication 1 MG: at 21:11

## 2020-01-01 RX ADMIN — FUROSEMIDE 20 MG: 10 INJECTION, SOLUTION INTRAVENOUS at 04:33

## 2020-01-01 RX ADMIN — PIPERACILLIN SODIUM AND TAZOBACTAM SODIUM 2.25 G: 2; .25 INJECTION, POWDER, LYOPHILIZED, FOR SOLUTION INTRAVENOUS at 04:26

## 2020-01-01 RX ADMIN — ATROPINE SULFATE 2 DROP: 10 SOLUTION/ DROPS OPHTHALMIC at 18:02

## 2020-01-01 RX ADMIN — MORPHINE SULFATE 10 MG: 100 SOLUTION ORAL at 18:00

## 2020-01-01 RX ADMIN — ATROPINE SULFATE 2 DROP: 10 SOLUTION/ DROPS OPHTHALMIC at 15:24

## 2020-01-01 RX ADMIN — PIPERACILLIN SODIUM AND TAZOBACTAM SODIUM 3.38 G: 3; .375 INJECTION, POWDER, LYOPHILIZED, FOR SOLUTION INTRAVENOUS at 03:50

## 2020-01-01 RX ADMIN — ATROPINE SULFATE 2 DROP: 10 SOLUTION/ DROPS OPHTHALMIC at 05:05

## 2020-01-01 RX ADMIN — SODIUM CHLORIDE: 9 INJECTION, SOLUTION INTRAVENOUS at 00:15

## 2020-01-01 RX ADMIN — MORPHINE SULFATE 2 MG: 2 INJECTION, SOLUTION INTRAMUSCULAR; INTRAVENOUS at 14:22

## 2020-01-01 RX ADMIN — LORAZEPAM 1 MG: 2 INJECTION INTRAMUSCULAR; INTRAVENOUS at 21:11

## 2020-01-01 ASSESSMENT — ACTIVITIES OF DAILY LIVING (ADL)
ADLS_ACUITY_SCORE: 17
ADLS_ACUITY_SCORE: 22
ADLS_ACUITY_SCORE: 19
ADLS_ACUITY_SCORE: 19
ADLS_ACUITY_SCORE: 17
ADLS_ACUITY_SCORE: 22
ADLS_ACUITY_SCORE: 17
ADLS_ACUITY_SCORE: 22
ADLS_ACUITY_SCORE: 17
ADLS_ACUITY_SCORE: 19
ADLS_ACUITY_SCORE: 17
ADLS_ACUITY_SCORE: 17
ADLS_ACUITY_SCORE: 21
ADLS_ACUITY_SCORE: 22
ADLS_ACUITY_SCORE: 17
ADLS_ACUITY_SCORE: 17
ADLS_ACUITY_SCORE: 19
ADLS_ACUITY_SCORE: 17
ADLS_ACUITY_SCORE: 22
ADLS_ACUITY_SCORE: 15
ADLS_ACUITY_SCORE: 19
ADLS_ACUITY_SCORE: 17
ADLS_ACUITY_SCORE: 17
ADLS_ACUITY_SCORE: 22

## 2020-01-01 ASSESSMENT — ENCOUNTER SYMPTOMS
SHORTNESS OF BREATH: 1
COUGH: 0
ABDOMINAL PAIN: 0
FEVER: 0

## 2020-01-01 ASSESSMENT — MIFFLIN-ST. JEOR
SCORE: 1259.13
SCORE: 1316.74

## 2020-06-17 PROBLEM — J96.00 ACUTE RESPIRATORY FAILURE (H): Status: ACTIVE | Noted: 2020-01-01

## 2020-06-18 NOTE — PROGRESS NOTES
[facility]    Hospitalist Progress Note    Assessment & Plan   Neo Marcos is a 88 year old male who presents with shortness of breath. Admitted for further evaluation and treatment.      Acute hypoxic respiratory failure, suspicion for CHF exacerbation versus COPD versus pneumonia: Patient was given antibiotics and steroids in the emergency department for concern of COPD or reactive airway disease component in addition to possible pneumonia or fluid on the lungs.  Patient was recently admitted in early June for a non-ST elevation MI at an outside facility but felt to be a poor interventional candidate and thus is readmitted now with acute worsening shortness of breath.  Patient lives at [place] per report.  Patient is anticoagulated on Xarelto per report.  Patient states that he has been having shortness of breath for a while which is worsening.  Patient denies abdominal pain, nausea, vomiting, sore throat, ear pain, eye pain, headache, cough, chest pain, rash.  In the emergency department his creatinine returned at 1.54 with a carbon dioxide level of 18, basic natruretic peptide is greater than 5000, with a procalcitonin less than 0.05 and a troponin of 0.054.  Venous blood gas shows pH of 7.37 with a PCO2 of 34 and an oxygen level of 61 with a bicarbonate of 20.  White blood cell count is normal, however hemoglobin is mildly reduced at 11.4, no report of external blood loss.  Alcohol level is less than 0.01.  Chest x-ray completed in the emergency department shows small bilateral pleural effusions, see report for further details.  Also shows lung base with suspicion for pneumonia and ill-defined consolidation in the right lower lung.  EKG on admission shows bundle-branch block with a rate of 81 bpm.  -Blood cultures x2.  -Lactic acid level.  -Empiric steroids started in ED.   -Close hemodynamic monitoring.  -Supportive cares.  -Zosyn.  -Procalcitonin pending.  -Pulmonary  hygiene.  -BiPAP PRN.  -COVID testing negative, repeat in 72 hours.   -Isolation precautions.      NSTEMI: Patient with elevated troponin on admission at 0.054, likely component of demand ischemia and underlying CAD.   -Echocardiogram.   -Serial troponin.   -Cardiology consulted.      Acute kidney injury, stage III: Patient with a creatinine of 1.54 on admission, baseline is approximately 1.  -Avoid nephrotoxins.     Cardiac, history of atrial fibrillation, hypertension: Patient continued on amlodipine, atorvastatin, metoprolol, lisinopril, and Xarelto at home.  Last echocardiogram completed in September 2018 showing decreased left ventricular ejection fraction of 40 to 45% with regional wall motion abnormalities, see report for further details.  Left ventricular systolic function mildly reduced at that time.  -Continue prior to admission amlodipine when verified by pharmacy.  -Continue prior to admission atorvastatin when verified by pharmacy.  -Hold prior to admission lisinopril.  -Continue prior to admission Xarelto when verified by pharmacy.  -PRN antihypertensives as needed.     BPH: Stable.  -Continue prior to admission finasteride when verified by pharmacy.     DVT Prophylaxis: Xarelto.  Code Status: DNR / DNI     Disposition: Inpatient.    Jahaira Augustine MD   Text Page (7am to 6pm)    Interval History   First covid negative, will recheck in 72 hours. Patient seen and examined.  No acute events over night.  No fevers or chills. No chest pain or SOB. Answered patient questions.    -Data reviewed today: I reviewed all new labs and imaging results over the last 24 hours. I personally reviewed the EKG tracing showing NSR.    Physical Exam   Temp: 95.8  F (35.4  C) Temp src: Oral BP: 112/57 Pulse: 67 Heart Rate: 101 Resp: 16 SpO2: 95 % O2 Device: Nasal cannula Oxygen Delivery: 4 LPM  Vitals:    06/18/20 0000 06/18/20 0630   Weight: 66.2 kg (146 lb) 72 kg (158 lb 11.2 oz)     Vital Signs with Ranges  Temp:  [95  F (35   C)-96  F (35.6  C)] 95.8  F (35.4  C)  Pulse:  [67-71] 67  Heart Rate:  [] 101  Resp:  [16-31] 16  BP: (106-127)/(56-70) 112/57  FiO2 (%):  [30 %] 30 %  SpO2:  [92 %-97 %] 95 %  No intake/output data recorded.    GENERAL: Alert. NAD. Conversational, appropriate.   HEENT: Normocephalic. EOMI. No icterus or injection. Nares normal.   LUNGS: Coarse bilaterally with decrement to bases. No dyspnea at rest.   HEART: Regular rate. Extremities perfused.   ABDOMEN: Soft, nontender, and nondistended. Positive bowel sounds.   EXTREMITIES: Mild LE edema noted.   NEUROLOGIC: Moves extremities x4 on command. No acute focal neurologic abnormalities noted.     Medications     - MEDICATION INSTRUCTIONS -       sodium chloride 50 mL/hr at 06/18/20 0015       methylPREDNISolone  40 mg Intravenous Q8H     piperacillin-tazobactam  2.25 g Intravenous Q6H     sodium chloride (PF)  3 mL Intracatheter Q8H     sodium chloride (PF)  3 mL Intracatheter Q8H       Data   Recent Labs   Lab 06/18/20  0411 06/18/20  0025 06/17/20  2106   WBC 5.9  --  6.4   HGB 10.5*  --  11.4*   MCV 89  --  90     --  263     --  136   POTASSIUM 4.5  --  4.1   CHLORIDE 109  --  107   CO2 21  --  18*   BUN 22  --  22   CR 1.40*  --  1.54*   ANIONGAP 7  --  11   AHMET 8.6  --  9.0   *  --  162*   ALBUMIN 3.1*  --   --    PROTTOTAL 6.3*  --   --    BILITOTAL 0.6  --   --    ALKPHOS 70  --   --    ALT 23  --   --    AST 19  --   --    TROPI 0.105* 0.092* 0.054*       Imaging:   Recent Results (from the past 24 hour(s))   XR Chest Port 1 View    Narrative    CHEST ONE VIEW PORTABLE   6/17/2020 9:26 PM     HISTORY: Shortness of breath.    COMPARISON: None.      Impression    IMPRESSION: Ill-defined consolidation in the right lower lung is  suspicious for pneumonia. There is also mild infiltrate at the left  lung base. There are likely small bilateral pleural effusions, greater  on the right. 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 Pulmonary vascularity is within normal limits.  Aortic calcification.    KAYLEEN SILVA MD

## 2020-06-18 NOTE — PLAN OF CARE
PT: PT orders received, chart reviewed. Pt's troponin up-trending, with Cardiology consult pending and plan for Echo. Will hold PT at this time.

## 2020-06-18 NOTE — CONSULTS
Care Transition Initial Assessment - SW     Met with: Did not meet with patient who is getting ruled out for COVID. Spoke to dtr Cristina (works at [facility])    Active Problems:    Acute respiratory failure (H)       DATA  Lives With: facility resident-[facility] RN- Rajani Charles ([phone])  Patient has a car and still drives  Quality of Family Relationships: helpful, involved, supportive  Description of Support System: Supportive, Involved  Who is your support system?: Children, Facility resident(s)/Staff  Identified issues/concerns regarding health management: discharge needs         Quality of Family Relationships: helpful, involved, supportive     ASSESSMENT  Cognitive Status:  Not assessed   Concerns to be addressed: discharge needs.  SW:  acknowledges consult. Awaiting Physical Therapy recommendations. SW to follow and assist with discharge needs.  Pt is an 88 yr old  male who admitted on 6/17/20 for acute respiratory failure. Patient lives at [facility] and prefers to return upon discharge. SW spoke to patient's dtr cristina who works at [facility] in admissions. SW also spoke to Rajani Charles ([phone])in the [facility] who indicates she would be surprised if patient was COVID+ as they have no cases of it in house. Patient able to return to [facility] over weekend if no increased needs. If additional needs, Thi could assess for possible return on weekend.     PLAN  Financial costs for the patient includes TBD .  Patient given options and choices for discharge TBD, pending therapy recommendations.    SW to continue to follow and assist with discharge planning.    JEANNA Chavis  Daytime (8:00am-4:30pm): [phone]  After-Hours SW Pager (4:30pm-11:30pm): [phone]

## 2020-06-18 NOTE — CODE/RAPID RESPONSE
[facility]    RRT Note  6/18/2020   Time Called: 1428    RRT called for: chest pain    Assessment & Plan     Chest pain suspect secondary to demand and cardiogenic edema  I was called to assess the patient for complaints of chest pain.  Upon my arrival, is lying in bed with increased work of breathing noted.  The patient also has a congested sounding cough with blood-tinged secretions.  EKG completed patient is noted to be more tacky otherwise EKG relatively unchanged from prior.  Patient states that he has midsternal chest pain at rest which he rates 5 out of 10.  He states that he has intermittently had chest pain episodes like this at home however they usually are exertional and improved with rest.  Cardiology has seen and evaluated the patient repeat echocardiogram results are pending.  He does have a prior echo that did show an EF of 40% with severe hypokinesis of the inferior and anterior walls.  He does have pleural effusions noted on chest x-ray.  Cardiology believes that the patient will require diuretic therapy.  At this time we will place the patient back on BiPAP due to increased work of breathing and administer Lasix as well as metoprolol for rate control due to increased demand with tachycardia.  Upon reassessment, the patient is resting with his eyes closed, he appears comfortable on BiPAP, respiratory rate has improved.  Nursing staff note that the patient states that his chest pain improved as soon as BiPAP was placed plan to continue to use BiPAP as needed with 2 hours on and 2 hours off intervals as patient tolerates.    INTERVENTIONS:  -Place patient back on bipap  -Metoprolol 2.5mg IV x1 now  -Lasix 20mg IV x1 now  -EKG (already completed on arrival)  -Condom catheter for I and O    At the end of the RRT hemodynamically stable and will remain in OBS.    Discussed with and defer further cares to bedside nurse and flying squad.    Interval History     Neo Marcos is a 88  year old male who was admitted on 6/17/2020 for acute hypoxic respiratory failure.    Medical history significant for:   Past Medical History:   Diagnosis Date     Atrial fibrillation (H)      Hypertension      Nonsenile cataract      Past Surgical History:   Procedure Laterality Date     ENT SURGERY       EYE SURGERY       HERNIA REPAIR       ORTHOPEDIC SURGERY         Code Status: Full Code    Allergies   No Known Allergies    Physical Exam   Vital Signs with Ranges:  Temp:  [95  F (35  C)-96  F (35.6  C)] 95.8  F (35.4  C)  Pulse:  [67-71] 67  Heart Rate:  [] 100  Resp:  [16-31] 16  BP: (106-127)/(56-73) 123/70  FiO2 (%):  [30 %-40 %] 40 %  SpO2:  [92 %-97 %] 94 %  I/O last 3 completed shifts:  In: 720 [P.O.:720]  Out: -     Constitutional: alert, mildly distressed  ENT: mucus membranes moist   Neck: supple   Pulmonary: coarse crackles throughout, increased work of breathing  Cardiovascular: tachycardic, S1, S2, no murmur, rub or gallop  GI: soft, nontender  Skin/Integumen: pale, multiple areas of bruising  Neuro: alert and oriented, cranial nerves grossly intact  Psych:  Normal affect    Data     EKG:  Interpreted by NITIN Muhammad CNP  Time reviewed: 1440  Symptoms at time of EKG: chest pain   Rhythm: sinus tachycardia  Rate: 100-110  Axis: Normal  Ectopy: none  Conduction: left bundle branch block (complete)  ST Segments/ T Waves: No acute ischemic changes, ST intervals appear chronic and unchanged from prior EKG  Q Waves: none  Comparison to prior: Unchanged    Clinical Impression: no acute changes    ABG:  -  Recent Labs   Lab 06/17/20  2140   O2PER 93       Troponin:    Recent Labs   Lab Test 06/18/20  0411  07/30/15  0217   TROPI 0.105*   < >  --    TROPONIN  --   --  0.02    < > = values in this interval not displayed.       IMAGING: (X-ray/CT/MRI)   Recent Results (from the past 24 hour(s))   XR Chest Port 1 View    Narrative    CHEST ONE VIEW PORTABLE   6/17/2020 9:26 PM     HISTORY:  Shortness of breath.    COMPARISON: None.      Impression    IMPRESSION: Ill-defined consolidation in the right lower lung is  suspicious for pneumonia. There is also mild infiltrate at the left  lung base. There are likely small bilateral pleural effusions, greater  on the right. 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 Pulmonary vascularity is within normal limits.  Aortic calcification.    KAYLEEN SILVA MD   Echocardiogram Complete    Narrative    [id number]  [id number]  [id number]  [id number]^TARYN^IGL^AKI           [facility]  Echocardiography Laboratory  [address] [place] [address]        Name: ABHINAV LAMB  MRN: [id number]  : 1931  Study Date: 2020 09:55 AM  Age: 88 yrs  Gender: Male  Patient Location: [facility]  Reason For Study: [facility]  Ordering Physician: GIL CENTENO  Referring Physician: GIL CENTENO  Performed By: AILEEN Murillo     BSA: 1.8 m2  Height: 65 in  Weight: 158 lb  HR: 84  BP: 121/67 mmHg  _____________________________________________________________________________  __        Procedure  Complete Portable Echo Adult. Optison (NDC #[id number]) given intravenously.  _____________________________________________________________________________  __        Interpretation Summary     The visual ejection fraction is estimated at 40-45%.  Septal motion is consistent with conduction abnormality.  There is mild to moderate eccentric left ventricular hypertrophy. The left  ventricle is mildly dilated.  There is diffuse global hypokinesis with multiple regional variations - mid-  distal anterior/anteroseptal, basal-mid lateral, and inferior segments  consistent with ischemic cardiomyopathy.  The right ventricle is normal in size and function.  There is mild to moderate (1-2+) mitral regurgitation. There is mild mitral  stenosis with mG of 5 mm Hg.  Right ventricular systolic pressure could not be approximated due  to  inadequate tricuspid regurgitation.  The inferior vena cava was normal in size with preserved respiratory  variability.     No significant changes compared to echo report from May 2020 in care-  everywhere. Of note LA was severely enlarged on that study. This present  study, it may have been poorly angulated.     _____________________________________________________________________________  __        Left Ventricle  The left ventricle is mildly dilated. There is mild to moderate eccentric left  ventricular hypertrophy. Diastolic Doppler findings (E/E' ratio and/or other  parameters) suggest left ventricular filling pressures are indeterminate. The  visual ejection fraction is estimated at 40-45%. Septal motion is consistent  with conduction abnormality.     Right Ventricle  The right ventricle is normal in size and function.     Atria  Normal left atrial size. Right atrial size is normal.     Mitral Valve  There is mild to moderate (1-2+) mitral regurgitation. There is mild mitral  stenosis. The mean mitral valve gradient is 4.6 mmHg.        Tricuspid Valve  There is trace tricuspid regurgitation. Right ventricular systolic pressure  could not be approximated due to inadequate tricuspid regurgitation.     Aortic Valve  The aortic valve is not well visualized. No aortic regurgitation is present.  No hemodynamically significant valvular aortic stenosis.     Pulmonic Valve  The pulmonic valve is not well visualized.     Vessels  The aortic root is not well visualized. The aortic root is normal size. The  inferior vena cava was normal in size with preserved respiratory variability.     Pericardium  There is no pericardial effusion.     _____________________________________________________________________________  __  MMode/2D Measurements & Calculations  IVSd: 0.88 cm  LVIDd: 6.7 cm  LVIDs: 4.6 cm  LVPWd: 1.1 cm  FS: 32.2 %  LV mass(C)d: 295.3 grams  LV mass(C)dI: 165.0 grams/m2     Ao root diam: 3.6 cm  LA  dimension: 4.6 cm  asc Aorta Diam: 3.3 cm  LA/Ao: 1.3  LVOT diam: 2.0 cm  LVOT area: 3.2 cm2  LA Volume Indexed (AL/bp): 27.7 ml/m2  RWT: 0.32        Doppler Measurements & Calculations  MV E max moses: 93.0 cm/sec  MV A max moses: 108.3 cm/sec  MV E/A: 0.86  MV max P.2 mmHg  MV mean P.6 mmHg  MV V2 VTI: 45.5 cm  MVA(VTI): 1.6 cm2  MV dec time: 0.19 sec     LV V1 max PG: 3.5 mmHg  LV V1 max: 93.0 cm/sec  LV V1 VTI: 22.6 cm  SV(LVOT): 72.2 ml  SI(LVOT): 40.4 ml/m2  PA acc time: 0.13 sec  Pulm Sys Moses: 54.6 cm/sec  Pulm Davis Moses: 44.5 cm/sec  Pulm S/D: 1.2  E/E' av.5  Lateral E/e': 9.0  Medial E/e': 14.0           _____________________________________________________________________________  __           Report approved by: Jayshree Sarkar 2020 03:16 PM          CBC with Diff:  Recent Labs   Lab Test 06/18/20  0411  07/30/15  0214   WBC 5.9   < > 5.3   HGB 10.5*   < > 14.6   MCV 89   < > 91      < > 200   INR  --   --  1.56*    < > = values in this interval not displayed.        Lactic Acid:    Lab Results   Component Value Date    LACT 0.6 2020           Comprehensive Metabolic Panel:  Recent Labs   Lab 20  041      POTASSIUM 4.5   CHLORIDE 109   CO2 21   ANIONGAP 7   *   BUN 22   CR 1.40*   GFRESTIMATED 44*   GFRESTBLACK 51*   AHMET 8.6   PROTTOTAL 6.3*   ALBUMIN 3.1*   BILITOTAL 0.6   ALKPHOS 70   AST 19   ALT 23       INR:    Recent Labs   Lab Test 07/30/15  0214   INR 1.56*       D-DIMER:  No results found for: DIMER    BNP:  No results found for: BNP    UA:  No results for input(s): COLOR, APPEARANCE, URINEGLC, URINEBILI, URINEKETONE, SG, UBLD, URINEPH, PROTEIN, UROBILINOGEN, NITRITE, LEUKEST, RBCU, WBCU in the last 168 hours.    Time Spent on this Encounter   I spent 20 minutes on the unit/floor managing the care of Neo Marcos. Over 50% of my time was spent counseling the patient and/or coordinating care regarding services listed in this note.    Lynn  NITIN Linder CNP

## 2020-06-18 NOTE — PROVIDER NOTIFICATION
MD Notification    Notified Person: MD    Notified Person Name:Dr Pack    Notification Date/Time: 100     Notification Interaction: Txt page    Purpose of Notification: 2nd trop elevated 0.092    Orders Received: EKG, Cards consult     Comments:

## 2020-06-18 NOTE — PROGRESS NOTES
"Cardiology consultation chart check:  Patient was admitted for acute hypoxic respiratory failure. CXR with suspicion for pneumonia and ill-defined consolidation in the right lower lung. Small alvino effusions. EKG shows SR with 1st degree AVR and RBBB at 81 bpm. NTproBNP 5084. Troponin 0.105. WBC normal, Hgb down trending at 10.5 from 11.4.  Known afib on xarelto.    Admission in May 2020 with NSTEMI at [facility]:  [facility]: severe, heavily calcified, multivessel CAD involving mid LAD, D1, ostial circumflex, OM1 CTA, RCA CTA, LM with mod disease. \" Any intervention would be high risk and require rotational atherectomy and likely Impella support. Although unknown, suspect with his PAD and heavily calcified aorta that he would not be candidate for Impella.\" this was medically managed.     Echo:  LVEF 40%, severe hypokinesis-akinesis of inferior and anterolateral walls, normal RV, no valaular disease, PA pressures normal.    Recommend echocardiogram, daily weights, strict I/O. Patient will likely require diuretic therapy given BNP and effusion. Recommend high intensity statin and aspirin therapy. Will formally see the patient following the return of his second COVID test.     NITIN Keen CNP  Pager:  [phone]   Text Page  (7am - 4pm, M-F)    "

## 2020-06-18 NOTE — PLAN OF CARE
Up A1. A/ox4. VSS CPAP at 30%. Denies CP, HA. SOB improved. CMS intact. Trop elevated 0.054, 0.092 Dr Pack notified. Stat EKG NSR 1st AVB and BBB. Diet: NPO. Plan for ECHO today.

## 2020-06-18 NOTE — ED NOTES
[facility]  ED Nurse Handoff Report    ED Chief complaint: Shortness of Breath      ED Diagnosis:   Final diagnoses:   None       Code Status: DNR / DNI    Allergies: No Known Allergies    Patient Story: 88 year old male anticoagulated on Xarelto for a-fib with a history of heart failure and COPD who was admitted from 6/11 to 6/13 for an NSTEMI who presents with shortness of breath. Per the patient, he has been having shortness of breath for a while which acutely worsened today.  Focused Assessment:  Pt on C-pap at 30 % O2. Pt is satting at 88 to 90%    Treatments and/or interventions provided: labs, EKG, chest xray, C-pap, zosyn 3.375 mg  Patient's response to treatments and/or interventions: good    To be done/followed up on inpatient unit:  nothing noted at this time    Does this patient have any cognitive concerns?: none    Activity level - Baseline/Home:  Unknown  Activity Level - Current:   Total Care    Patient's Preferred language: English   Needed?: No    Isolation: None  Infection: Not Applicable  Bariatric?: No    Vital Signs:   Vitals:    06/17/20 2119   SpO2: 94%       Cardiac Rhythm:     Was the PSS-3 completed:   Yes  What interventions are required if any?               Family Comments: none present but the  is his daughter Zamzam JI brochure/video discussed/provided to patient/family: no              Name of person given brochure if not patient: na              Relationship to patient: na    For the majority of the shift this patient's behavior was Green.   Behavioral interventions performed were none.    ED NURSE PHONE NUMBER: [phone]

## 2020-06-18 NOTE — ED PROVIDER NOTES
History     Chief Complaint:  Shortness of Breath    HPI  History limited by: poor historian.      Neo Marcos is a 88 year old male anticoagulated on Xarelto for a-fib with a history of heart failure and COPD who was admitted from 5/11 to 5/13 for an NSTEMI who presents via EMS with shortness of breath. Per the patient, he has been having shortness of breath for a while which acutely worsened today. He has had similar episodes in the past. No chest pain or significant cough. He lives in [facility] home. Patient is DNR/DNI.  Patient was noted to be satting in the low 80s with crackles in his lung bases at his care facility today and EMS was called; transported to the ED for further evaluation and care.  En route patient did receive aspirin.    Allergies:  No known drug allergies.    Medications:    Xarelto  Imdur  Toprol  Lipitor  Aspirin 81 MG  Norvasc  Zestril  Desyrel  Remeron  Proscar    Past Medical History:    Atrial fibrillation  Hypertension  CKD, stage 3  Chronic systolic heart failure  Hyperlipidemia  PVD  COPD  BPH  Prostate cancer  Carotid artery stenosis  Alcohol abuse    Past Surgical History:    Carotid endardectomy, right  Femoral-popliteal bypass graft, right  Inguinal hernia repair, bilateral  Eye surgery, bilateral tarsal strip/smr  Cataract removal bilateral  Intravitreal injection x5    Family History:    Father: Heart disease, hypertension  Mother: Cancer  Brother: Cancer    Social History:  Smoking Status: Former Smoker (Quit 05/20/2005)  Smokeless Tobacco: Never Used  Alcohol Use: Positive  Drug Use: Negative  PCP: Moncho Turner  Marital Status:       Review of Systems   Constitutional: Negative for fever.   Respiratory: Positive for shortness of breath. Negative for cough.    Cardiovascular: Negative for chest pain and leg swelling.   Gastrointestinal: Negative for abdominal pain.   All other systems reviewed and are negative.      Physical Exam     Patient Vitals for the  "past 24 hrs:   BP Temp Temp src Pulse Heart Rate Resp SpO2 Height Weight   06/18/20 0000 -- -- -- -- -- -- -- 1.651 m (5' 5\") 66.2 kg (146 lb)   06/17/20 2348 -- 95  F (35  C) Oral -- 66 23 93 % -- --   06/17/20 2300 116/59 -- -- 67 66 30 93 % -- --   06/17/20 2200 106/56 -- -- 71 70 (!) 31 92 % -- --   06/17/20 2119 -- -- -- -- -- -- 94 % -- --       Physical Exam   General: Alert and cooperative with exam. Patient in moderate distress. Normal mentation. Oxygen facemask in place.  Head:  Scalp is NC/AT  Eyes:  No scleral icterus, PERRL  ENT:  The external nose and ears are normal. The oropharynx is normal and without erythema; mucus membranes are moist.  Neck:  Normal range of motion without rigidity.  CV:  Regular rate and rhythm  Resp:  Absent lung sounds in his right lung base.    Mildly labored; tachypneic  GI:  Abdomen is soft, no distension, no tenderness. No peritoneal signs  MS:  Trace lower extremity edema  Skin:  Warm and dry, No rash or lesions noted.  Neuro: Oriented x 3. No gross motor deficits.      Emergency Department Course     ECG:  ECG taken at 2106, ECG read at 2120  Sinus rhythm with 1st degree AV block  Left bundle branch block  Abnormal ECG  No significant change from ECG dated 9/11/18.   Rate 81 bpm. CO interval 270 ms. QRS duration 148 ms. QT/QTc 420/487 ms. P-R-T axes 43 -7 109.    Imaging:  Radiology findings were communicated with the patient who voiced understanding of the findings.    XR Chest Port 1 View  Ill-defined consolidation in the right lower lung is  suspicious for pneumonia. There is also mild infiltrate at the left  lung base. There are likely small bilateral pleural effusions, greater  on the right. 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 Pulmonary vascularity is within normal limits.  Aortic calcification.  Reading per radiology.    Laboratory:  Laboratory findings were communicated with the patient who voiced understanding of the " findings.    CBC: WBC 6.4, HGB 11.4(L),   BMP: Carbon dioxide 18(L), Glucose 162(H), Creatinine 1.54(H), GFR estimate 39(L) o/w WNL  BNP: 5094(H)  Troponin (Collected 2106): 0.054(H)  Alcohol Ethyl: <0.01  Blood gas venous and oxyhgb: pH 7.37 PCO2 34(L) PO2 61(H) Bicarbonate 20(L) Oxyhemoglobin 89 Base Deficit 4.8 FlO2 93  Procalcitonin: <0.05  Blood Culture x2: In process    COVID-19 by PCR: In process    Interventions:  2133: Duoneb, 3 mL, Nebulization  2135: Solu-Medrol, 125 mg, IV  2230: Zosyn, 3.375 g, IV    Emergency Department Course:    2053 Patient arrived at the ER. I performed an exam of the patient as documented above.     2106 EKG obtained in the ED, see results above. IV was inserted and blood was drawn for laboratory testing, results above.    2110 The patient was sent for an XR while in the emergency department, results above.     2120 Patient swabbed for COVID-19 testing.    2213 I spoke with Dr. Pack of the hospitalist service from [facility] regarding patient's presentation, findings, and plan of care.     Findings and plan explained to the patient who consents to admission. Discussed the patient with Dr. Pack, who will admit the patient to an [facility] bed for further monitoring, evaluation, and treatment.    Impression & Plan      Medical Decision Making:  Neo Marcos is a 88 year old male who presents to the emergency department today for evaluation of shortness of breath.  Patient presents by EMS on CPAP.  Patient's medical history and records were reviewed.  Initial consideration for, but not limited to, ACS/MI, COPD exacerbation, CHF exacerbation, infectious process, COVID-19, pulmonary edema, among others.  Labs, EKG, and imaging was obtained.  EKG demonstrates left bundle branch block which is not significantly changed from previous EKG. Troponin was noted to be mildly elevated though patient denies chest pain and does not have any ischemic changes on EKG; received  aspirin from EMS and is on Xarelto; will defer any additional anticoagulation at this time.  On arrival patient was transitioned to BiPAP with noted improvement in respiratory status.  He was provided 125 mg Solu-Medrol and DuoNeb for possible COPD exacerbation.  VBG without significant elevation of pCO2.  Chest x-ray with infiltrates as noted above; concern for possible pneumonia.  Blood cultures were obtained and patient was provided Zosyn.  COVID testing obtained and pending.  Labs also notable for mild worsening renal insufficiency (creatinine 1.54, baseline 1.25), and mildly elevated BNP; patient does not appear significantly fluid overloaded on evaluation; will defer diuresis at this time.  Patient demonstrated significant clinical improvement throughout ED course and will be admitted to [facility] with the hospitalist service for further evaluation and care.  Presentation consistent with acute hypoxic respiratory failure secondary to pneumonia.    Critical Care time was 35 minutes for this patient excluding procedures.    Zamzam (Daughter): [phone]    Diagnosis:    ICD-10-CM    1. Acute respiratory failure with hypoxia (H)  J96.01    2. Pneumonia of right lower lobe due to infectious organism  J18.9      Disposition:   Patient was admitted to an [facility] bed.    Scribe Disclosure:  Alfred PEREZ, am serving as a scribe at 9:03 PM on 6/17/2020 to document services personally performed by Rahul Can DO based on my observations and the provider's statements to me.       EMERGENCY DEPARTMENT       Rahul Can DO  06/18/20 0102

## 2020-06-18 NOTE — PHARMACY-ADMISSION MEDICATION HISTORY
Pharmacy Medication History  Admission medication history interview status for the 6/17/2020  admission is complete. See EPIC admission navigator for prior to admission medications     Medication history sources: Patient's family/friend (daughter)  Medication history source reliability: Good  Adherence assessment: Good    Significant changes made to the medication list:  1) Medications added:    Aspirin    Mirtazapine    Thiamine    Trazodone    Folic acid    Tylenol scheduled    Imdur     2) Medications updated:    Amlodipine (10 mg AM --> 10 mg PM)     Atorvastatin (40 mg AM --> 40 mg PM)    Additional medication history information:   1) Imdur was recently increased from 30 mg to 120 mg by outside provider.     Medication reconciliation completed by provider prior to medication history? Yes    Time spent in this activity: 20 minutes      Prior to Admission medications    Medication Sig Last Dose Taking? Auth Provider   acetaminophen (TYLENOL) 325 MG tablet Take 650 mg by mouth every 6 hours as needed for mild pain PRN at PRN Yes Unknown, Entered By History   acetaminophen (TYLENOL) 500 MG tablet Take 500-1,000 mg by mouth At Bedtime 6/17/2020 at PM Yes Unknown, Entered By History   amLODIPine (NORVASC) 10 MG tablet Take 10 mg by mouth At Bedtime  6/17/2020 at PM Yes Unknown, Entered By History   aspirin (ASA) 81 MG chewable tablet Take 81 mg by mouth daily 6/17/2020 at AM Yes Unknown, Entered By History   atorvastatin (LIPITOR) 40 MG tablet Take 40 mg by mouth At Bedtime  6/17/2020 at PM Yes Unknown, Entered By History   finasteride (PROSCAR) 5 MG tablet Take 5 mg by mouth daily 6/17/2020 at AM Yes Unknown, Entered By History   folic acid (FOLVITE) 1 MG tablet Take 1 mg by mouth daily 6/17/2020 at Am Yes Unknown, Entered By History   isosorbide mononitrate CR (IMDUR) 120 MG 24 HR ER tablet Take 120 mg by mouth every morning 6/17/2020 at AM Yes Unknown, Entered By History   lisinopril (PRINIVIL,ZESTRIL) 20 MG  tablet Take 20 mg by mouth daily 6/17/2020 at AM Yes Unknown, Entered By History   metoprolol succinate ER (TOPROL-XL) 25 MG 24 hr tablet Take 25 mg by mouth daily 6/17/2020 at AM Yes Unknown, Entered By History   mirtazapine (REMERON) 15 MG tablet Take 15 mg by mouth At Bedtime 6/17/2020 at PM Yes Unknown, Entered By History   Multiple Vitamins-Minerals (PRESERVISION AREDS 2 PO) Take 1 capsule by mouth 2 times daily 6/17/2020 at AM/PM Yes Unknown, Entered By History   multivitamin, therapeutic with minerals (THERA-VIT-M) TABS Take 1 tablet by mouth daily 6/17/2020 at AM Yes Unknown, Entered By History   nystatin (MYCOSTATIN) 163617 UNIT/GM external powder Apply 1 g topically 2 times daily as needed (rash) PRN at PRN Yes Unknown, Entered By History   Omega-3 Fatty Acids (FISH OIL) 500 MG CAPS Take 500 mg by mouth daily 6/17/2020 at AM Yes Unknown, Entered By History   polyethylene glycol (MIRALAX) 17 g packet Take 1 packet by mouth daily as needed for constipation PRN at PRN Yes Unknown, Entered By History   rivaroxaban ANTICOAGULANT (XARELTO) 15 MG TABS tablet Take 1 tablet (15 mg) by mouth daily (with dinner) 6/17/2020 at PM Yes Rad Carmona MD   traZODone (DESYREL) 50 MG tablet Take 25 mg by mouth At Bedtime 6/17/2020 at PM Yes Unknown, Entered By History   vitamin B1 (THIAMINE) 100 MG tablet Take 100 mg by mouth daily 6/17/2020 at AM Yes Unknown, Entered By History   Vitamin D, Cholecalciferol, 1000 UNITS TABS Take 1,000 Units by mouth daily 6/17/2020 at AM Yes Unknown, Entered By History       Monse Jaeger, Pharm.D.

## 2020-06-18 NOTE — PROVIDER NOTIFICATION
MD Notification    Notified Person: MD    Notified Person Name: Dr. Hoover    Notification Date/Time: 6/18/2020 6320     Notification Interaction: Talked in person at nurses station    Purpose of Notification: Can we discontinue IMC? Patient is on 4L NC and has not needed BIPAP since admission.    Orders Received: OK to discontinue IMC.     Comments:    ADDENDUM:    Patient later had sudden onset of dyspnea/shortness of breath later in the afternoon. Placed back on BIPAP. IMC status restarted.

## 2020-06-18 NOTE — H&P
[facility]    History and Physical  Hospitalist       Date of Admission:  6/17/2020  Date of Service (when I saw the patient): 06/17/20    Assessment & Plan   Neo Marcos is a 88 year old male who presents with shortness of breath. Admitted for further evaluation and treatment.     Acute hypoxic respiratory failure, suspicion for CHF exacerbation versus COPD versus pneumonia: Patient was given antibiotics and steroids in the emergency department for concern of COPD or reactive airway disease component in addition to possible pneumonia or fluid on the lungs.  Patient was recently admitted in early June for a non-ST elevation MI at an outside facility but felt to be a poor interventional candidate and thus is readmitted now with acute worsening shortness of breath.  Patient lives at [place] per report.  Patient is anticoagulated on Xarelto per report.  Patient states that he has been having shortness of breath for a while which is worsening.  Patient denies abdominal pain, nausea, vomiting, sore throat, ear pain, eye pain, headache, cough, chest pain, rash.  In the emergency department his creatinine returned at 1.54 with a carbon dioxide level of 18, basic natruretic peptide is greater than 5000, with a procalcitonin less than 0.05 and a troponin of 0.054.  Venous blood gas shows pH of 7.37 with a PCO2 of 34 and an oxygen level of 61 with a bicarbonate of 20.  White blood cell count is normal, however hemoglobin is mildly reduced at 11.4, no report of external blood loss.  Alcohol level is less than 0.01.  Chest x-ray completed in the emergency department shows small bilateral pleural effusions, see report for further details.  Also shows lung base with suspicion for pneumonia and ill-defined consolidation in the right lower lung.  EKG on admission shows bundle-branch block with a rate of 81 bpm.  -[facility].  -Blood cultures x2.  -Lactic acid level.  -Empiric steroids started in ED.    -Close hemodynamic monitoring.  -Supportive cares.  -Zosyn.  -Procalcitonin pending.  -Pulmonary hygiene.  -BiPAP PRN.  -COVID testing pending.   -Isolation precautions.     NSTEMI: Patient with elevated troponin on admission at 0.054, likely component of demand ischemia and underlying CAD.   -Echocardiogram.   -Serial troponin.   -Cardiology consultation as clinically indicated.      Acute kidney injury, stage III: Patient with a creatinine of 1.54 on admission, baseline is approximately 1.  -Avoid nephrotoxins.    Cardiac, history of atrial fibrillation, hypertension: Patient continued on amlodipine, atorvastatin, metoprolol, lisinopril, and Xarelto at home.  Last echocardiogram completed in September 2018 showing decreased left ventricular ejection fraction of 40 to 45% with regional wall motion abnormalities, see report for further details.  Left ventricular systolic function mildly reduced at that time.  -Continue prior to admission amlodipine when verified by pharmacy.  -Continue prior to admission atorvastatin when verified by pharmacy.  -Hold prior to admission lisinopril.  -Continue prior to admission Xarelto when verified by pharmacy.  -PRN antihypertensives as needed.    BPH: Stable.  -Continue prior to admission finasteride when verified by pharmacy.    DVT Prophylaxis: Xarelto.  Code Status: DNR / DNI    Disposition: Inpatient.    Dr. Brett Pack D.O.  [facility] Hospitalist  Pager [phone]    Primary Care Physician   Son Jose Turner    Chief Complaint   Shortness of breath    History is obtained from the patient and medical records.     History of Present Illness   Neo Marcos is a 88 year old male who presents with shortness of breath. Admitted for further evaluation and treatment. Patient was given antibiotics and steroids in the emergency department for concern of COPD or reactive airway disease component in addition to possible pneumonia or fluid on the lungs.  Patient  was recently admitted in early June for a non-ST elevation MI at an outside facility but felt to be a poor interventional candidate and thus is readmitted now with acute worsening shortness of breath.  Patient lives at [place] per report.  Patient is anticoagulated on Xarelto per report.  Patient states that he has been having shortness of breath for a while which is worsening.  Patient denies abdominal pain, nausea, vomiting, sore throat, ear pain, eye pain, headache, cough, chest pain, rash.  In the emergency department his creatinine returned at 1.54 with a carbon dioxide level of 18, basic natruretic peptide is greater than 5000, with a procalcitonin less than 0.05 and a troponin of 0.054.  Venous blood gas shows pH of 7.37 with a PCO2 of 34 and an oxygen level of 61 with a bicarbonate of 20.  White blood cell count is normal, however hemoglobin is mildly reduced at 11.4, no report of external blood loss.  Alcohol level is less than 0.01.  Chest x-ray completed in the emergency department shows small bilateral pleural effusions, see report for further details.  Also shows lung base with suspicion for pneumonia and ill-defined consolidation in the right lower lung.  EKG on admission shows bundle-branch block with a rate of 81 bpm.    Past Medical History    I have reviewed this patient's medical history and updated it with pertinent information if needed.   Past Medical History:   Diagnosis Date     Atrial fibrillation (H)      Hypertension      Nonsenile cataract        Past Surgical History   I have reviewed this patient's surgical history and updated it with pertinent information if needed.  Past Surgical History:   Procedure Laterality Date     ENT SURGERY       EYE SURGERY       HERNIA REPAIR       ORTHOPEDIC SURGERY         Prior to Admission Medications   Prior to Admission Medications   Prescriptions Last Dose Informant Patient Reported? Taking?   Multiple Vitamins-Minerals (PRESERVISION AREDS 2 PO)   Self Yes No   Sig: Take 1 capsule by mouth 2 times daily   Vitamin D, Cholecalciferol, 1000 UNITS TABS  Self Yes No   Sig: Take 1,000 Units by mouth daily   amLODIPine (NORVASC) 10 MG tablet  Self Yes No   Sig: Take 10 mg by mouth daily   atorvastatin (LIPITOR) 40 MG tablet  Self Yes No   Sig: Take 40 mg by mouth daily   finasteride (PROSCAR) 5 MG tablet  Self Yes No   Sig: Take 5 mg by mouth daily   fish oil-omega-3 fatty acids 1000 MG capsule  Self Yes No   Sig: Take 1 g by mouth daily   lisinopril (PRINIVIL,ZESTRIL) 20 MG tablet  Self Yes No   Sig: Take 20 mg by mouth daily   metoprolol succinate (TOPROL-XL) 50 MG 24 hr tablet   Yes No   Sig: Take 50 mg by mouth daily   multivitamin, therapeutic with minerals (THERA-VIT-M) TABS  Self Yes No   Sig: Take 1 tablet by mouth daily   rivaroxaban ANTICOAGULANT (XARELTO) 15 MG TABS tablet   No No   Sig: Take 1 tablet (15 mg) by mouth daily (with dinner)      Facility-Administered Medications: None     Allergies   No Known Allergies    Social History   I have reviewed this patient's social history and updated it with pertinent information if needed. Neo Marcos  reports that he has quit smoking. He has never used smokeless tobacco. He reports current alcohol use. He reports that he does not use drugs.    Family History   I have reviewed this patient's family history and updated it with pertinent information if needed.   Family History    Medical History Relation Name Comments   Heart Disease Birth Father       Hypertension Birth Father       Cancer Birth Mother       Cancer Brother       Glaucoma Maternal Grandmother       Hearing Loss Sister       Amblyopia/Strabismus Negative Family History       Cataract Negative Family History       Diabetes Negative Family History       Macular Degeneration Negative Family History       Retinal Detachment Negative Family History           Review of Systems   The 10 point Review of Systems is negative other than noted in the  HPI or here.     Physical Exam               SpO2: 94 % O2 Device: BiPAP/CPAP    Vital Signs with Ranges  FiO2 (%):  [30 %] 30 %  SpO2:  [94 %] 94 %  0 lbs 0 oz    GENERAL: Alert. NAD. Conversational, appropriate.   HEENT: Normocephalic. EOMI. No icterus or injection. Nares normal.   LUNGS: Coarse bilaterally with decrement to bases. No dyspnea at rest.   HEART: Regular rate. Extremities perfused.   ABDOMEN: Soft, nontender, and nondistended. Positive bowel sounds.   EXTREMITIES: Mild LE edema noted.   NEUROLOGIC: Moves extremities x4 on command. No acute focal neurologic abnormalities noted.     Data   Data reviewed today:  I personally reviewed both laboratory and imaging data.   Recent Labs   Lab 06/17/20  2106   WBC 6.4   HGB 11.4*   MCV 90         POTASSIUM 4.1   CHLORIDE 107   CO2 18*   BUN 22   CR 1.54*   ANIONGAP 11   AHMET 9.0   *   TROPI 0.054*       Recent Results (from the past 24 hour(s))   XR Chest Port 1 View    Narrative    CHEST ONE VIEW PORTABLE   6/17/2020 9:26 PM     HISTORY: Shortness of breath.    COMPARISON: None.      Impression    IMPRESSION: Ill-defined consolidation in the right lower lung is  suspicious for pneumonia. There is also mild infiltrate at the left  lung base. There are likely small bilateral pleural effusions, greater  on the right. 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 Pulmonary vascularity is within normal limits.  Aortic calcification.    KAYLEEN SILVA MD

## 2020-06-18 NOTE — PROVIDER NOTIFICATION
MD Notification    Notified Person: MD    Notified Person Name: Ramone     Notification Date/Time: 6/18/2020 0030    Notification Interaction:  paged    Purpose of Notification: COVID negative     Orders Received: Pt symptomatic, will reswab. Will  Keep in isolation    Comments:

## 2020-06-18 NOTE — PLAN OF CARE
Patient is orientedx4 though forgetful. VSS. Was on 6L NC for most of the day but had sudden onset of dyspnea around 1430. RRT called, see note. Patient rested on BIPAP for remainder of afternoon, reports feeling much better. Up with SBA. Voiding per urinal. Plan for COVID retest in 1-2 days.

## 2020-06-19 NOTE — PLAN OF CARE
A&O. Forgetful. RRT called see note. Pt placed on BIPAP overnight. Afebrile. Tachypneic at times. HR tachy at times. HR and RR improved on BIPAP. Trop elevated, recheck this am. Cards following. Lasix given. Chest XR done. LS wheezes at times, bilateral Course crackles but more prominent on rt. Voiding per urinal but inc at times. + md RENY notified. Radames added.

## 2020-06-19 NOTE — CONSULTS
"[facility]    Cardiology Consultation     Date of Admission:  6/17/2020    Assessment & Plan   Neo Marcos is a 88 year old male who was admitted on 6/17/2020. I was asked to see the patient for elevated troponin.    1. Acute respiratory failure, likely multifactorial: infectious source and CHF exacerbation     CXR suspicious for pneumonia    Initial COVID negative, given symptomatic, rechecking in 72 hours (6/20)    Lactate for sepsis 4.0    Lactic acid 1.9    Blood cultures positive for Staphylococcus epidermidis     BiPAP to maintain SpO2 above 90    vancomycin     2. Acute on chronic congestive heart failure exacerbation with ischemic cardiomyopathy     CXR with small alvino effusions    NTproBNP 5084 ---> 16, 268    Echo reveals LVEF 40-45%, mild-mod LVH, LV mildly dilated, diffuse global hypokinesis with multiple regional variations consistent with ischemic cardiomyopathy, 1-2+ MR, RVSP could not be measured due to inadequate TR    I/O + 95    Wt 158 pounds    IV lasix x1 overnight (cr 1.65, GFR 36)    3. Elevated troponin, likely demand ischemia    Troponin 4.65    EKG with LBBB    Known CAD    No current chest pain    4. Multivessel CAD    [facility] 05/2020 at [facility] : severe, heavily calcified, multivessel CAD involving mid LAD, D1, ostial circumflex, OM1 CTA, RCA CTA, LM with mod disease. \" Any intervention would be high risk and require rotational atherectomy and likely Impella support. Although unknown, suspect with his PAD and heavily calcified aorta that he would not be candidate for Impella.\" This was medically managed.    Aspirin, atorvastatin 40 mg daily, Toprol XL 25 mg daily     5. Hyperlipidemia    LDL 65 in 10/2019    Atorvastatin 40 mg daily    6. Hypertension    Uncontrolled, 126/105    Amlodipine 10 mg daily, Toprol XL 25 mg daily, Imdur 120 mg daily    7. Paroxysmal atrial fibrillation    EKG SR with LBBB    Tele 67 bpm    Rate controlled on Toprol 25 mg " "daily    Anticoagulated with Xarelto 15 mg daily      8. Acute kidney injury, stage 3    Creatinine 1.65, GFR 36, BUN 30    Creatinine baseline 1      Plan:  1. Start IV lasix 20 mg BID  2. Increase Toprol to 25 mg BID for HTN  3. Strict I/O  4. Daily weights  5. Continue to medically treat his coronary disease at this point given his extensive disease that's difficult to treat with aspirin, atorvastatin, imdur and beta blocker.    NITIN Keen CNP    Code Status    DNR/DNI    Reason for Consult   Reason for consult: elevated troponine    Primary Care Physician   Son Jose Turner    Chief Complaint   Shortness of breath     History is obtained from [facility].     History of Present Illness   Neo Marcos is a 88 year old male with a past medical history significant for hypertension, multivessel coronary disease, atrial fibrillation, hypertension, hyperlipidemia, who presents with shortness of breath.    The patient presented to the ED with worsening shortness of breath over the last week. He was admitted with an NSTEMI to [facility]. (see below). Following discharge, he returned to [place]. Since returning, he has his shortness of breath progressively worsened. Chest xray showed small bilateral effusions and was suspicions for pneumonia. He was admitted for further evaluation.     Admission in May 2020 with NSTEMI at [facility]:  [facility]: severe, heavily calcified, multivessel CAD involving mid LAD, D1, ostial circumflex, OM1 CTA, RCA CTA, LM with mod disease. \" Any intervention would be high risk and require rotational atherectomy and likely Impella support. Although unknown, suspect with his PAD and heavily calcified aorta that he would not be candidate for Impella.\" This was medically managed.      Echo:  LVEF 40%, severe hypokinesis-akinesis of inferior and anterolateral walls, normal RV, no valaular disease, PA pressures normal.      Past Medical History   I have reviewed this patient's medical history " and updated it with pertinent information if needed.   Past Medical History:   Diagnosis Date     Atrial fibrillation (H)      Hypertension      Nonsenile cataract        Past Surgical History   I have reviewed this patient's surgical history and updated it with pertinent information if needed.  Past Surgical History:   Procedure Laterality Date     ENT SURGERY       EYE SURGERY       HERNIA REPAIR       ORTHOPEDIC SURGERY         Prior to Admission Medications   Prior to Admission Medications   Prescriptions Last Dose Informant Patient Reported? Taking?   Multiple Vitamins-Minerals (PRESERVISION AREDS 2 PO) 6/17/2020 at AM/PM Daughter Yes Yes   Sig: Take 1 capsule by mouth 2 times daily   Omega-3 Fatty Acids (FISH OIL) 500 MG CAPS 6/17/2020 at AM Daughter Yes Yes   Sig: Take 500 mg by mouth daily   Vitamin D, Cholecalciferol, 1000 UNITS TABS 6/17/2020 at AM Daughter Yes Yes   Sig: Take 1,000 Units by mouth daily   acetaminophen (TYLENOL) 325 MG tablet PRN at PRN Daughter Yes Yes   Sig: Take 650 mg by mouth every 6 hours as needed for mild pain   acetaminophen (TYLENOL) 500 MG tablet 6/17/2020 at PM Daughter Yes Yes   Sig: Take 500-1,000 mg by mouth At Bedtime   amLODIPine (NORVASC) 10 MG tablet 6/17/2020 at PM Daughter Yes Yes   Sig: Take 10 mg by mouth At Bedtime    aspirin (ASA) 81 MG chewable tablet 6/17/2020 at AM Daughter Yes Yes   Sig: Take 81 mg by mouth daily   atorvastatin (LIPITOR) 40 MG tablet 6/17/2020 at PM Daughter Yes Yes   Sig: Take 40 mg by mouth At Bedtime    finasteride (PROSCAR) 5 MG tablet 6/17/2020 at AM Daughter Yes Yes   Sig: Take 5 mg by mouth daily   folic acid (FOLVITE) 1 MG tablet 6/17/2020 at Am Daughter Yes Yes   Sig: Take 1 mg by mouth daily   isosorbide mononitrate CR (IMDUR) 120 MG 24 HR ER tablet 6/17/2020 at AM Daughter Yes Yes   Sig: Take 120 mg by mouth every morning   lisinopril (PRINIVIL,ZESTRIL) 20 MG tablet 6/17/2020 at AM Daughter Yes Yes   Sig: Take 20 mg by mouth daily    metoprolol succinate ER (TOPROL-XL) 25 MG 24 hr tablet 6/17/2020 at AM Daughter Yes Yes   Sig: Take 25 mg by mouth daily   mirtazapine (REMERON) 15 MG tablet 6/17/2020 at PM Daughter Yes Yes   Sig: Take 15 mg by mouth At Bedtime   multivitamin, therapeutic with minerals (THERA-VIT-M) TABS 6/17/2020 at AM Daughter Yes Yes   Sig: Take 1 tablet by mouth daily   nystatin (MYCOSTATIN) 122030 UNIT/GM external powder PRN at PRN Daughter Yes Yes   Sig: Apply 1 g topically 2 times daily as needed (rash)   polyethylene glycol (MIRALAX) 17 g packet PRN at PRN Daughter Yes Yes   Sig: Take 1 packet by mouth daily as needed for constipation   rivaroxaban ANTICOAGULANT (XARELTO) 15 MG TABS tablet 6/17/2020 at PM Daughter No Yes   Sig: Take 1 tablet (15 mg) by mouth daily (with dinner)   traZODone (DESYREL) 50 MG tablet 6/17/2020 at PM Daughter Yes Yes   Sig: Take 25 mg by mouth At Bedtime   vitamin B1 (THIAMINE) 100 MG tablet 6/17/2020 at AM Daughter Yes Yes   Sig: Take 100 mg by mouth daily      Facility-Administered Medications: None     Allergies   No Known Allergies    Social History   I have reviewed this patient's social history and updated it with pertinent information if needed. Neo Marcos  reports that he has quit smoking. He has never used smokeless tobacco. He reports current alcohol use. He reports that he does not use drugs.    Family History   I have reviewed this patient's family history and updated it with pertinent information if needed.   History reviewed. No pertinent family history.    Review of Systems   The 10 point Review of Systems is negative other than noted in the HPI or here.     Physical Exam   Temp: 95.7  F (35.4  C) Temp src: Axillary BP: (!) 126/105   Heart Rate: 99 Resp: 24 SpO2: 92 % O2 Device: BiPAP/CPAP Oxygen Delivery: 6 LPM  Vital Signs with Ranges  Temp:  [95.7  F (35.4  C)-96.2  F (35.7  C)] 95.7  F (35.4  C)  Heart Rate:  [] 99  Resp:  [16-30] 24  BP: (107-133)/()  126/105  FiO2 (%):  [40 %-70 %] 70 %  SpO2:  [88 %-95 %] 92 %  158 lbs 11.2 oz      Constitutional:   NAD   Skin:   Warm and dry   Head:   Nontraumatic   Neck:   supple, symmetrical, trachea midline   Lungs:   Fine crackles     Cardiovascular:   regular rate and rhythm, normal S1 and S2 and no murmur noted   Abdomen:   Benign   Extremities and Back:   No edema   Neurological:   Grossly nonfocal       Data   Most Recent 3 CBC's:  Recent Labs   Lab Test 06/19/20  0640 06/18/20  0411 06/17/20  2106   WBC 14.4* 5.9 6.4   HGB 11.5* 10.5* 11.4*   MCV 90 89 90    256 263     Most Recent 3 BMP's:  Recent Labs   Lab Test 06/19/20  0640 06/18/20  0411 06/17/20  2106    137 136   POTASSIUM 3.9 4.5 4.1   CHLORIDE 110* 109 107   CO2 18* 21 18*   BUN 30 22 22   CR 1.65* 1.40* 1.54*   ANIONGAP 10 7 11   AHMET 8.4* 8.6 9.0   * 139* 162*

## 2020-06-19 NOTE — PROGRESS NOTES
Paged regarding positive blood cultures. 1/2 positive for gram positive cocci in clusters. Chart reviewed. WBC WNL. Procal on admission WNL. CXR with ill definted consolidation in the right lower lung suspicious for pneumonia, also mild infiltrate at the left lung base. Covered on Zosyn since admission, will add Vancomycin to cover for potential MRSA until cultures fully resulted. Monitor vitals and clinical status closely.

## 2020-06-19 NOTE — PLAN OF CARE
PT: Orders received, chart reviewed, discussed with bedside RN. Pt has been on BiPAP most of the day, tenuous respiratory status. Per RN, no plan to re-test for COVID, pt being transferred to CCU. RN in agreement to hold therapy eval today to allow for improved respiratory status prior to exercise/mobility.

## 2020-06-19 NOTE — PHARMACY-VANCOMYCIN DOSING SERVICE
Pharmacy Vancomycin Initial Note  Date of Service 2020  Patient's  1931  88 year old, male    Indication: Bacteremia and Community Acquired Pneumonia    Current estimated CrCl = Estimated Creatinine Clearance: 37.1 mL/min (A) (based on SCr of 1.4 mg/dL (H)).    Creatinine for last 3 days  2020:  9:06 PM Creatinine 1.54 mg/dL  2020:  4:11 AM Creatinine 1.40 mg/dL    Recent Vancomycin Level(s) for last 3 days  No results found for requested labs within last 72 hours.      Vancomycin IV Administrations (past 72 hours)      No vancomycin orders with administrations in past 72 hours.                Nephrotoxins and other renal medications (From now, onward)    Start     Dose/Rate Route Frequency Ordered Stop    20 2100  vancomycin 1500 mg in 0.9% NaCl 250 ml intermittent infusion 1,500 mg      1,500 mg  over 90 Minutes Intravenous EVERY 24 HOURS 20 20520 1600  piperacillin-tazobactam (ZOSYN) 3.375 g vial to attach to  mL bag      3.375 g  over 30 Minutes Intravenous EVERY 6 HOURS 20 1332      20 1309  lisinopril (ZESTRIL) tablet 20 mg      20 mg Oral DAILY 20 1308            Contrast Orders - past 72 hours (72h ago, onward)    Start     Dose/Rate Route Frequency Ordered Stop    20 1039  perflutren diluted 1mL to 2mL with saline (OPTISON) diluted injection 3 mL      3 mL Intravenous ONCE 20 1038 20 1039                Plan:  1.  Start vancomycin  1500 mg IV q24h.   2.  Goal Trough Level: 15-20 mg/L   3.  Pharmacy will check trough levels as appropriate in 1-3 Days.    4. Serum creatinine levels will be ordered daily for the first week of therapy and at least twice weekly for subsequent weeks.    5. [place] method utilized to dose vancomycin therapy: Method 2    Alicia Torres, Jorge LuisD

## 2020-06-19 NOTE — PROGRESS NOTES
[facility]    Hospitalist Progress Note    Date of Admission:  6/17/2020    Assessment & Plan      Neo Marcos is a 88 year old male who presents with shortness of breath. Admitted for further evaluation and treatment.      Acute hypoxic respiratory failure  Acute on chronic systolic heart failure  Ischemic cardiomyopathy  NSTEMI  Lactic acidosis secondary to NSTEMI  Severe diffuse multivessel CAD with recent NSTEMI in May 2020, medically managed  Staph epidermidis bacteremia most likely contaminant,?  Possible pneumonia  : Patient was given antibiotics and steroids in the emergency department for concern of COPD or reactive airway disease component in addition to possible pneumonia or fluid on the lungs.  Patient was recently admitted in May for a non-ST elevation MI at [facility] but felt to be a poor interventional candidate and was medically managed.  He is readmitted now with acute worsening shortness of breath.  Patient lives at [facility] per report.  Patient is anticoagulated on Xarelto.  Patient states that he has been having shortness of breath for a while which is worsening.  Patient denies abdominal pain, nausea, vomiting, sore throat, ear pain, eye pain, headache,  rash.  He does endorse cough and intermittent chest pain.  In the emergency department his creatinine returned at 1.54 with a carbon dioxide level of 18, basic natruretic peptide is greater than 5000, with a procalcitonin less than 0.05 and a troponin of 0.054.  Venous blood gas shows pH of 7.37 with a PCO2 of 34 and an oxygen level of 61 with a bicarbonate of 20.  White blood cell count is normal, however hemoglobin is mildly reduced at 11.4, no report of external blood loss.  Alcohol level is less than 0.01.  Chest x-ray completed in the emergency department shows small bilateral pleural effusions, see report for further details.  Also shows lung base with suspicion for pneumonia and ill-defined  consolidation in the right lower lung.  EKG on admission shows bundle-branch block with a rate of 81 bpm.  -Blood cultures x2 drawn at admission is growing staph epidermidis in 1 bottle.  He did not have leukocytosis or fever at admission.  Remains afebrile.  Low suspicion that he has true bacteremia or pneumonia especially given negative procalcitonin.  Will repeat blood cultures on 6/19.  Leave on vancomycin for now.  Discontinue Zosyn.  -Discontinue steroids as this does not appear to be COPD exacerbation  -Repeat BNP this morning is greater than 16,000.  Suspect his issues are primarily cardiac stemming from the NSTEMI.  -We will discontinue COVID-19 precautions.  Low suspicion for COVID.  Noted negative on 6/17/20.  We will have cardiology see him today.  -IV diuresis with Lasix.  Monitor creatinine closely as it is higher than yesterday.  -Close hemodynamic monitoring.  -Continue BiPAP PRN.  -Continues on baby aspirin, atorvastatin, Imdur, Toprol-XL.  Hold lisinopril due to DIDI.  -Anticoagulated with Xarelto.  -Echocardiogram on 6/18 showed EF at 40 to 45%, mild to moderate LVH, diffuse global hypokinesis with multiple regional variations consistent with ischemic cardiomyopathy, mild to moderate MR.  Reportedly no significant changes compared to echo report from 8/20/2020.   -Troponin went up to 4 this morning.  Continue trending till peak.  -Await further cardiology recommendations.    H/o COPD  H/o tobacco abuse  -No inhalers or nebulizers on PTA med list.  No CO2 retention on blood gas.  Do not suspect that he has a COPD exacerbation.  He did receive empiric steroids in the ED.  This has been discontinued.    Acute kidney injury, stage III: Patient with a creatinine of 1.54 on admission, baseline is approximately 1.  Did go up to 1.65.  -Monitor closely with IV diuresis.  -Avoid nephrotoxins.     History of atrial fibrillation, hypertension: Patient continued on amlodipine, atorvastatin, metoprolol,  and  Xarelto.  -Hold prior to admission lisinopril.  -PRN antihypertensives as needed.     BPH: Stable.  -Continue prior to admission finasteride      DVT Prophylaxis: Xarelto.  Code Status: DNR / DNI     Disposition: Inpatient.  [facility] bed.  Discharge greater than 3 days pending clinical progress.     I did call and discuss patient's care with his daughter Zamzam.  Patient and family are all in the same page that he wants to be DNR/DNI.  If patient is not progressing well in the next 48 hours or so family wants to discuss possible comfort cares.    Dina Franco MD  Text Page (7am - 6pm, M-F)    Interval History   Patient has required BiPAP overnight.  This morning he mainly has complaints that the BiPAP is not seated in the right position and is quite uncomfortable for him but he does state that he breathes better with it on.  He states that he gets chest pain when the BiPAP was taken off.  Denied any fevers.  Has been coughing.    -Data reviewed today: I reviewed all new labs and imaging results over the last 24 hours. I personally reviewed EKG with result as noted above and CXR with result as noted above    Physical Exam   Temp: 95.7  F (35.4  C) Temp src: Axillary BP: (!) 126/105   Heart Rate: 99 Resp: 24 SpO2: 92 % O2 Device: BiPAP/CPAP Oxygen Delivery: 6 LPM  Vitals:    06/18/20 0000 06/18/20 0630   Weight: 66.2 kg (146 lb) 72 kg (158 lb 11.2 oz)     Vital Signs with Ranges  Temp:  [95.7  F (35.4  C)-96.2  F (35.7  C)] 95.7  F (35.4  C)  Heart Rate:  [] 99  Resp:  [16-30] 24  BP: (107-133)/() 126/105  FiO2 (%):  [40 %-70 %] 70 %  SpO2:  [88 %-95 %] 92 %  I/O last 3 completed shifts:  In: 820 [P.O.:820]  Out: 625 [Urine:625]    Constitutional: Alert, appears comfortable, in no acute distress, wearing BiPAP  Respiratory: Non labored breathing, faint crackles bilaterally, no wheezing  Cardiovascular: Heart sounds regular rate and rhythm, no murmurs, no leg edema  GI: Abdomen is soft, non distended, non  tender. Normal BS  Skin/Integumen: no rashes  Neuro: alert, converses appropriately, moving all extremities, fluent speech, no facial asymmetry  Psych: mood and affect appropriate      Medications     - MEDICATION INSTRUCTIONS -         amLODIPine  10 mg Oral At Bedtime     aspirin  81 mg Oral Daily     atorvastatin  40 mg Oral At Bedtime     finasteride  5 mg Oral Daily     furosemide         furosemide  20 mg Intravenous BID     isosorbide mononitrate  120 mg Oral QAM     metoprolol succinate ER  25 mg Oral Daily     mirtazapine  15 mg Oral At Bedtime     multivitamin  with lutein  1 capsule Oral BID     multivitamin w/minerals  1 tablet Oral Daily     rivaroxaban ANTICOAGULANT  15 mg Oral Daily with supper     sodium chloride (PF)  3 mL Intracatheter Q8H     traZODone  25 mg Oral At Bedtime     vancomycin (VANCOCIN) IV  1,500 mg Intravenous Q24H       Data   Recent Labs   Lab 06/19/20  0640 06/18/20  2347 06/18/20  0411  06/17/20  2106   WBC 14.4*  --  5.9  --  6.4   HGB 11.5*  --  10.5*  --  11.4*   MCV 90  --  89  --  90     --  256  --  263     --  137  --  136   POTASSIUM 3.9  --  4.5  --  4.1   CHLORIDE 110*  --  109  --  107   CO2 18*  --  21  --  18*   BUN 30  --  22  --  22   CR 1.65*  --  1.40*  --  1.54*   ANIONGAP 10  --  7  --  11   AHMET 8.4*  --  8.6  --  9.0   *  --  139*  --  162*   ALBUMIN 3.3*  --  3.1*  --   --    PROTTOTAL 6.9  --  6.3*  --   --    BILITOTAL 0.8  --  0.6  --   --    ALKPHOS 71  --  70  --   --    ALT 22  --  23  --   --    AST 50*  --  19  --   --    TROPI 4.656* 0.432* 0.105*   < > 0.054*    < > = values in this interval not displayed.       Imaging  Recent Results (from the past 24 hour(s))   XR Chest Port 1 View    Narrative    EXAM: XR CHEST PORT 1 VW  LOCATION: [facility]  DATE/TIME: 6/19/2020 4:50 AM    INDICATION: Dyspnea.  COMPARISON: None.      Impression    IMPRESSION: Heart is enlarged. Aorta is atherosclerotic. No pneumothorax.  Interstitial prominence. Bilateral lower lung edema or infiltrates greatest right lower lung. Small bilateral pleural effusions. CHF. Bilateral pleural and granulomatous   calcifications suspected. Diffuse demineralization. Monitoring electrodes.

## 2020-06-19 NOTE — PROVIDER NOTIFICATION
Writer spoke with daughter, Zamzam, on the phone and gave an update. Daughter was upset stating that she has not received an update from an MD since the patient was admitted. Writer told daughter that the MD would be paged notifying her of the daughter's update request. MD paged at approximately 1300 with request.

## 2020-06-19 NOTE — PROVIDER NOTIFICATION
MD Notification    Notified Person: MD    Notified Person Name: Dina       Notification Date/Time: 6/19/2020 0725    Notification Interaction: Paged    Purpose of Notification: Trop 4.55    Orders Received:    Comments:

## 2020-06-19 NOTE — PROVIDER NOTIFICATION
MD Notification    Notified Person: MD    Notified Person Name: Ramone     Notification Date/Time: 6/18/2020 2323    Notification Interaction: paged    Purpose of Notification: + BC.     Orders Received: no new orders    Comments:

## 2020-06-19 NOTE — PROVIDER NOTIFICATION
MD Notification    Notified Person: MD    Notified Person Name: ORAL Lynch     Notification Date/Time: 6/18/2020 2029    Notification Interaction: paged    Purpose of Notification: + BC gram + cocci with clusters.     Orders Received: Radames     Comments:

## 2020-06-19 NOTE — PROGRESS NOTES
Patient is A/O, vss, tachypneic on Bipap at 70%, tele SR  With 1st degree AVB , up with one to bedside commode, patient uncomfortable on Bipap, wants it taken off, RT tried to wean him off and put  On Oxymask but patient sats dropped to 83% at 12L, patient placed on comfort care, family will vist shortly, after which will wean him off mask and use Nasal canula/oxymask and start comfort measures.

## 2020-06-19 NOTE — PROGRESS NOTES
I visited with the patient again this afternoon.  The BiPAP mask is very uncomfortable for him and he would like to discontinue it.  He states that he is ready to die and he wants to transition to comfort focus cares only.  I called and spoke with his daughter Zamzam and family is on the same page as patient desiring transition to comfort focus cares only.  Zamzam plans to arrive shortly to visit patient with her other siblings.  I discussed with her that we will leave patient on BiPAP mask till family is done visiting.  Will discontinue BiPAP mask in place on nasal cannula oxygen/oxygen mask for comfort.  We will continue only on medications for comfort at that time.  Patient and family understand that he might pass away shortly after BiPAP mask is taken off.    I spent an additional 30 minutes visiting patient, discussing with family and coordinating transition to comfort focus cares.

## 2020-06-19 NOTE — PROVIDER NOTIFICATION
"MD paged to call daughter, Zamzam, again regarding potentially transitioning patient to comfort care status. Per katrin, pt stated that he is uncomfortable with Bipap mask on and \"wants to die\". Writer paged MD, MD called back and spoke with bedside RN.   "

## 2020-06-19 NOTE — PROGRESS NOTES
ROLAND  I: ROLAND spoke with Zamzam, Patient's daughter, who states that she would prefer patient to return to [facility] at discharge. SW will need to contact Rajani Charles ([phone]) with [facility] when medically stable to determine if he would need added services at his [facility] since he currently is receiving none. If medically stable over the weekend, they are able to accept back if no services are needed. If added services are required, they would assess and would possibly take over the weekend. Estefani from [facility] stated they would have a bed available for patient in the case he would need one.     P: SW will continue to follow and assist as needed.    TANO Concepcion, LGSW  [phone]  [facility]

## 2020-06-19 NOTE — PROGRESS NOTES
Sepsis Evaluation Progress Note    I was called to see Neo Marcos due to abnormal vital signs triggering the Sepsis SIRS screening alert. He is known to have an infection.     Physical Exam   Vital Signs:  Temp: 96.1  F (35.6  C) Temp src: Axillary BP: 107/66   Heart Rate: 86 Resp: 22 SpO2: 93 % O2 Device: BiPAP/CPAP Oxygen Delivery: 6 LPM    Lab:  Lactic Acid   Date Value Ref Range Status   06/18/2020 0.6 (L) 0.7 - 2.0 mmol/L Final     Lactate for Sepsis Protocol   Date Value Ref Range Status   06/18/2020 4.0 (HH) 0.7 - 2.0 mmol/L Final     Comment:     Critical Value called to and read back by  DOLORES KULKARNI IN OBSERVATION AT 2358 BY AF       The patient is at baseline mental status per nursing report.     The rest of their physical exam is significant for:    Gen: Pt lying in bed, sleeping with Bipap in place, in no apparent distress  Neuro: (Had just fallen asleep, nursing reports orientation at baseline, pleasant, no focal neuro deficits, moves all extremities equally)  Resp: In no apparent respiratory distress, mildly tachypneic, coarse lung sounds throughout R lung, clear throughout L lung, upper airway expiratory wheezes noted with auscultation, no use of chest/abdominal accessory muscles  GI: Round, soft, nondistended, nontender to palpation, hypoactive bowel sounds, no guarding or rebound tenderness noted  Skin: Warm, dry, no obvious mottling noted  Ext: No peripheral edema noted    Assessment & Plan   NO EVIDENCE OF SEPSIS at this time.  Vital sign, physical exam, and lab findings are likely due to acute HFrEF exacerbation, ischemic cardiomyopathy, hypoperfusion, demand ischemia, hypoxia (PNA vs COPD).     SIRS: HR, RR    QSOFA: RR    Noted RRT earlier today for chest pain, concerning for cardiogenic edema.  IVF stopped at that time as well as IV lasix administered.  Noted BNP 5000.  Admission CXR with small bilateral pleural effusions.  Echo today notes EF 40-45%, diffuse global hypokinesis,  ischemic cardiomyopathy, IVC normal in size.  Pt now with bacteremia, on zosyn, vancomycin; WBC WNL, procalcitonin negative on admission, CXR concerning for PNA.     Interventions:  - Will add on troponin, procalcitonin  - Will defer additional IV lasix at this time as pt not in acute respiratory distress (did note mild tachypnea; however, pulling 500+ ml TV, triggering Bipap > 98%, RR 22-24, on 45% O2 with O2 sats 93%), DIDI   - Noted high suspicion for COVID, pending retest at this time    - Pt continues on zosyn, vancomycin at this time  - Pt continues on xarelto and ASA at this time  - Noted initial hepatic panel WNL, CMP ordered in AM (will evaluate for possible congestive hepatopathy)     Disposition: The patient will remain on the current unit. We will continue to monitor this patient closely.     NITIN Phillips [facility]   [facility]    Sepsis Criteria   Sepsis: 2+ SIRS criteria due to infection  Severe Sepsis: Sepsis AND 1+ new sign of acute organ dysfunction (Note: lactate >2 is organ dysfunction)  Septic Shock: Sepsis AND hypotension despite volume resuscitation with 30 ml/kg crystalloid

## 2020-06-19 NOTE — PROGRESS NOTES
"[facility] brief update note:    Was paged by nursing as pt now refusing Bipap, sounds wet.  Pt had been sleeping but when he woke up pt stated \"this is making me feel claustrophobic\".  Nursing notes when pt took off Bipap he acutely desatted and required 15+ L O2 via oxymask to maintain O2 sats > 90%.  Fortunately, nursing was able to discuss options with pt and pt was in agreement to place Bipap back on.  Pt's VS noting HR 110s, SBP 120s, RR 20s-30s.  Will judiciously order 20 mg IV lasix (noted slightly improving creatinine) and stat CXR.  Pt's CODE STATUS ordered as Full Code but progress note states DNR/DNI.  If pt further deteriorates, will contact family at that time to determine pt's CODE STATUS.      Evan Thompson, APRN, CNP  [facility]    NO charge.  "

## 2020-06-20 NOTE — PROGRESS NOTES
[facility]    Hospitalist Progress Note      Assessment & Plan      Comfort cares: pt was transitioned to comfort cares on 6/19 with removal of Bipap and placed on NC O2.  He is more confused this morning, satting in the mid 80s on NC O2 and feels more short of breath.  Pt notes that he wants to go to heaven.  Daughter and son at bedside and in agreement with continuing on comfort cares.  All thought that O2 would be completely removed so are in agreement that we should remove the O2 going forward.  We discussed about meds we use to keep him comfortable.   -pretreat with 2mg of morphine before removing O2  -additional morphine as needed for air hunger - will increase dose as needed if air hunger persists  -continue comfort cares, consider transition to station 88 this afternoon  -discussed that if patient is still with us tomorrow would need to look at options for transfer out of the hospital    Acute hypoxic respiratory failure  Acute on chronic systolic heart failure  Ischemic cardiomyopathy  NSTEMI  Lactic acidosis secondary to NSTEMI  Severe diffuse multivessel CAD with recent NSTEMI in May 2020, medically managed  Staph epidermidis bacteremia most likely contaminant,?  Possible pneumonia  Patient was given antibiotics and steroids in the emergency department for concern of COPD or reactive airway disease component in addition to possible pneumonia or fluid on the lungs.  Patient was recently admitted in May for a non-ST elevation MI at [facility] but felt to be a poor interventional candidate and was medically managed.  He is readmitted here with acute worsening shortness of breath.  Patient lives at [facility] per report.  In the emergency department his creatinine returned at 1.54 with a carbon dioxide level of 18, basic natruretic peptide is greater than 5000, with a procalcitonin less than 0.05 and a troponin of 0.054.  Venous blood gas shows pH of 7.37 with a PCO2 of 34 and an  oxygen level of 61 with a bicarbonate of 20.  White blood cell count is normal, however hemoglobin is mildly reduced at 11.4, no report of external blood loss.  Chest x-ray completed in the emergency department shows small bilateral pleural effusions, see report for further details.  Also shows lung base with suspicion for pneumonia and ill-defined consolidation in the right lower lung.  EKG on admission shows bundle-branch block with a rate of 81 bpm.  -Blood cultures x2 drawn at admission is growing staph epidermidis in 1 bottle.  He did not have leukocytosis or fever at admission.  Remains afebrile.  Low suspicion that he has true bacteremia or pneumonia especially given negative procalcitonin.  was treated with vanco and zosyn which were discontinued on 6/19 with transition to comfort cares.  -IV diuresis with Lasix with some response, stopped with comfort cares  -baby aspirin, atorvastatin, Imdur, Toprol-XL discontinued with comfort cares.   -Echocardiogram on 6/18 showed EF at 40 to 45%, mild to moderate LVH, diffuse global hypokinesis with multiple regional variations consistent with ischemic cardiomyopathy, mild to moderate MR.  Reportedly no significant changes compared to echo report from 8/20/2020.      H/o COPD  H/o tobacco abuse  -No inhalers or nebulizers on PTA med list.  No CO2 retention on blood gas.  Do not suspect that he has a COPD exacerbation.  He did receive empiric steroids in the ED which have been discontinued.      Acute kidney injury, stage III: Patient with a creatinine of 1.54 on admission, baseline is approximately 1.  Did go up to 1.65. no further monitoring with comfort cares.      History of atrial fibrillation, hypertension: PTA on amlodipine, atorvastatin, metoprolol,  and Xarelto.     BPH: Stable. prior to admission on finasteride      DVT Prophylaxis: Xarelto discontinued due to comfort cares.  Code Status: DNR / DNI     Disposition: Inpatient.  comfort cares.  Expect him to  pass within the next 24 hours but discussed with the family we may need to consider transition to TCU if he does not.    [facility]      Interval History   Pt seems confused today.  Son and daughter are at bedside and supportive of decision to continue with comfort cares and patient's desire to move on to [facility].  Patient notes he is currently short of breath.  Tolerated a 2mg dose of morphine earlier, made him a bit sleepy.  Patient and family would like to proceed with removal of supplemental O2.      -Data reviewed today: I reviewed all new labs and imaging results over the last 24 hours. I personally reviewed no images or EKG's today.    Physical Exam       BP: 115/67 Pulse: 87 Heart Rate: 92 Resp: 24 SpO2: (!) 86 % O2 Device: Nasal cannula Oxygen Delivery: 4 LPM  Vitals:    06/18/20 0000 06/18/20 0630   Weight: 66.2 kg (146 lb) 72 kg (158 lb 11.2 oz)     Vital Signs with Ranges  Pulse:  [87] 87  Heart Rate:  [] 92  Resp:  [20-24] 24  BP: (115-123)/(66-74) 115/67  FiO2 (%):  [55 %-70 %] 70 %  SpO2:  [83 %-94 %] 86 %  I/O last 3 completed shifts:  In: -   Out: 125 [Urine:125]    Constitutional: awake, alert, cooperative    Respiratory: mostly clear, some wheezing and crackles noted    Cardiovascular: Regular rate and rhythm.  No murmur, rub or gallop noted.     GI: Positive bowel sounds, soft, non-distended, non-tender.  No masses or organomegaly noted.     Neurologic: Awake, alert and oriented to name, place and time.  Cranial nerves II-XII are grossly intact.      Skin: no new rash    Medications     - MEDICATION INSTRUCTIONS -         isosorbide mononitrate  120 mg Oral QAM     mirtazapine  15 mg Oral At Bedtime     sodium chloride (PF)  3 mL Intracatheter Q8H     traZODone  25 mg Oral At Bedtime       Data   Recent Labs   Lab 06/19/20  0640 06/18/20  2347 06/18/20  0411  06/17/20  2106   WBC 14.4*  --  5.9  --  6.4   HGB 11.5*  --  10.5*  --  11.4*   MCV 90  --  89  --  90     --  217   --  263     --  137  --  136   POTASSIUM 3.9  --  4.5  --  4.1   CHLORIDE 110*  --  109  --  107   CO2 18*  --  21  --  18*   BUN 30  --  22  --  22   CR 1.65*  --  1.40*  --  1.54*   ANIONGAP 10  --  7  --  11   AHMET 8.4*  --  8.6  --  9.0   *  --  139*  --  162*   ALBUMIN 3.3*  --  3.1*  --   --    PROTTOTAL 6.9  --  6.3*  --   --    BILITOTAL 0.8  --  0.6  --   --    ALKPHOS 71  --  70  --   --    ALT 22  --  23  --   --    AST 50*  --  19  --   --    TROPI 4.656* 0.432* 0.105*   < > 0.054*    < > = values in this interval not displayed.       No results found for this or any previous visit (from the past 24 hour(s)).

## 2020-06-20 NOTE — PLAN OF CARE
Pt on comfort cares. Denies pain. 5mg IV morphine given on shift for SOB/air hunger/tachypnea. Restless at times. Pt ambulated to  to void, dribbling is baseline. Up in chair x1 during the day. Zamzam and Moshe (children) present throughout the day, plan to return ~1700. Will transfer pt to  once they arrive.    Problem: ARDS (Acute Respiratory Distress Syndrome)  Goal: Effective Oxygenation  6/20/2020 1739 by Randee Quinones, RN  Outcome: Change based on patient need/priority  6/20/2020 0601 by Aaliyah Davis, RN  Outcome: No Change

## 2020-06-20 NOTE — PROGRESS NOTES
"EP- Cardiology Progress Note           Assessment and Plan:   89 yo M with Hx of HTN, COPD, CKD, PAF and CAD (recent non-STEMI; cath showing severe multivessel disease), who p/w respiratory failure in the setting of CHF/pneumonia/COPD exacerbation.  Tn were elevated (peak of 4.65), Bcx were positive and he was not a good candidate for revascularization yesterday.    Echo: EF of 40-45%.  There was mild to moderate MR.    He was made comfort care today.  We will sign off.  Please call with questions.    Physical Exam:  Vitals: /67   Pulse 87   Temp 95.7  F (35.4  C) (Axillary)   Resp 28   Ht 1.651 m (5' 5\")   Wt 72 kg (158 lb 11.2 oz)   SpO2 (!) 86%   BMI 26.41 kg/m        Intake/Output Summary (Last 24 hours) at 6/20/2020 1205  Last data filed at 6/19/2020 2100  Gross per 24 hour   Intake --   Output 25 ml   Net -25 ml     Vitals:    06/18/20 0000 06/18/20 0630   Weight: 66.2 kg (146 lb) 72 kg (158 lb 11.2 oz)                               Review of Systems:   As per subjective, otherwise 5 systems reviewed and negative.         Medications:          isosorbide mononitrate  120 mg Oral QAM     mirtazapine  15 mg Oral At Bedtime     sodium chloride (PF)  3 mL Intracatheter Q8H     traZODone  25 mg Oral At Bedtime     PRN Meds: acetaminophen, acetaminophen, atropine, bisacodyl, artificial tears ophthalmic solution, furosemide, lidocaine 4%, lidocaine (buffered or not buffered), LORazepam **OR** LORazepam **OR** LORazepam, melatonin, morphine, naloxone, nitroGLYcerin, - MEDICATION INSTRUCTIONS -, ondansetron **OR** ondansetron, polyethylene glycol, senna-docusate **OR** senna-docusate, sodium chloride (PF)             Data:     Recent Labs   Lab 06/19/20  0640 06/18/20  2347 06/18/20  0411  06/17/20  2106   WBC 14.4*  --  5.9  --  6.4   HGB 11.5*  --  10.5*  --  11.4*   MCV 90  --  89  --  90     --  256  --  263     --  137  --  136   POTASSIUM 3.9  --  4.5  --  4.1   CHLORIDE 110*  --  109  " --  107   CO2 18*  --  21  --  18*   BUN 30  --  22  --  22   CR 1.65*  --  1.40*  --  1.54*   ANIONGAP 10  --  7  --  11   AHMET 8.4*  --  8.6  --  9.0   *  --  139*  --  162*   ALBUMIN 3.3*  --  3.1*  --   --    PROTTOTAL 6.9  --  6.3*  --   --    BILITOTAL 0.8  --  0.6  --   --    ALKPHOS 71  --  70  --   --    ALT 22  --  23  --   --    AST 50*  --  19  --   --    TROPI 4.656* 0.432* 0.105*   < > 0.054*    < > = values in this interval not displayed.

## 2020-06-20 NOTE — PLAN OF CARE
"Dr. Rankin at bedside.  Family at bedside, discussing plan with pt. Pt states \"I want to go to heaven.\"    Plan: remove o2. Give pt 2mg morphine prior.    1150: o2 removed. Pt stated \"let's do this.\"  "

## 2020-06-20 NOTE — PLAN OF CARE
Plan to transfer pt to . Report given to Juanita Kebede RN.     BEE Wyman, present at bedside. Will transfer to new unit with pt.

## 2020-06-20 NOTE — PLAN OF CARE
Comfort Care.  A&O at beginning of shift, more confused during the night. On 4L O2 via NC, with some SOB and abdominal breathing. Up A1 to BSC, only seems able to urinate while standing. Dribbling incontinence of bladder. Regular diet. Family was visiting and calls regularly for updates. LS coarse. Q1H morphine & ativan available. Gave atropine once, ativan twice, morphine 3x. Regular diet. Skin bruised.

## 2020-06-21 NOTE — PLAN OF CARE
Pt oriented to self only. Occasionally attempting to leave bed, sitter in place for pt safety. PRN ativan and roxanol given throughout night to keep pt comfortable. A2, not OOB this shift. Incontinent of urine x1, no BM overnight. R PIV SL. Plan to look for transfer to TCU today pending pt status.

## 2020-06-21 NOTE — PROGRESS NOTES
[facility]    Hospitalist Progress Note      Assessment & Plan      Comfort cares: pt was transitioned to comfort cares on 6/19 with removal of Bipap and placed on NC O2.  After discussion with patient and children on 6/20 he was premedicated with morphine and transitioned off all O2.  He was transferred to  where he has required additional morphine, haldol and ativan.  He is more lethargic and tachypneic on 6/21.  Based upon how he looks I would expect him to pass sometime today so will not plan a transition out of the hospital at this time.    -increased morphine to q1 hr prn dosing  -continue comfort care meds and plan    Acute hypoxic respiratory failure  Acute on chronic systolic heart failure  Ischemic cardiomyopathy  NSTEMI  Lactic acidosis secondary to NSTEMI  Severe diffuse multivessel CAD with recent NSTEMI in May 2020, medically managed  Staph epidermidis bacteremia most likely contaminant,?  Possible pneumonia  Patient was given antibiotics and steroids in the emergency department for concern of COPD or reactive airway disease component in addition to possible pneumonia or fluid on the lungs.  Patient was recently admitted in May for a non-ST elevation MI at [facility] but felt to be a poor interventional candidate and was medically managed.  He is readmitted here with acute worsening shortness of breath.  Patient lives at [facility] per report.  In the emergency department his creatinine returned at 1.54 with a carbon dioxide level of 18, basic natruretic peptide is greater than 5000, with a procalcitonin less than 0.05 and a troponin of 0.054.  Venous blood gas shows pH of 7.37 with a PCO2 of 34 and an oxygen level of 61 with a bicarbonate of 20.  White blood cell count is normal, however hemoglobin is mildly reduced at 11.4, no report of external blood loss.  Chest x-ray completed in the emergency department shows small bilateral pleural effusions, see report for further  details.  Also shows lung base with suspicion for pneumonia and ill-defined consolidation in the right lower lung.  EKG on admission shows bundle-branch block with a rate of 81 bpm.  -Blood cultures x2 drawn at admission is growing staph epidermidis in 1 bottle.  He did not have leukocytosis or fever at admission.  Remains afebrile.  Low suspicion that he has true bacteremia or pneumonia especially given negative procalcitonin.  was treated with vanco and zosyn which were discontinued on 6/19 with transition to comfort cares.  -IV diuresis with Lasix with some response, stopped with comfort cares  -baby aspirin, atorvastatin, Imdur, Toprol-XL discontinued with comfort cares.   -Echocardiogram on 6/18 showed EF at 40 to 45%, mild to moderate LVH, diffuse global hypokinesis with multiple regional variations consistent with ischemic cardiomyopathy, mild to moderate MR.  Reportedly no significant changes compared to echo report from 8/20/2020.      H/o COPD  H/o tobacco abuse  -No inhalers or nebulizers on PTA med list.  No CO2 retention on blood gas.  Do not suspect that he has a COPD exacerbation.  He did receive empiric steroids in the ED which have been discontinued.      Acute kidney injury, stage III: Patient with a creatinine of 1.54 on admission, baseline is approximately 1.  Did go up to 1.65. no further monitoring with comfort cares.      History of atrial fibrillation, hypertension: PTA on amlodipine, atorvastatin, metoprolol,  and Xarelto.     BPH: Stable. prior to admission on finasteride      DVT Prophylaxis: Xarelto discontinued due to comfort cares.  Code Status: DNR / DNI     Disposition: Inpatient.  comfort cares.  Expect him to pass later today.     Suman Rankin      Interval History   Lethargic today and not responding to voice.  Seems a bit tachypneic.  Not agitated or restless now but was earlier in discussion with the bedside RN.  Also discussed with daughter and son in the hospital.      -Data reviewed today: I reviewed all new labs and imaging results over the last 24 hours. I personally reviewed no images or EKG's today.    Physical Exam             Resp: 30       Vitals:    06/18/20 0000 06/18/20 0630   Weight: 66.2 kg (146 lb) 72 kg (158 lb 11.2 oz)     Vital Signs with Ranges  Resp:  [18-32] 30  I/O last 3 completed shifts:  In: 200 [P.O.:200]  Out: 225 [Urine:225]    Constitutional: lying in bed, not responsive.     Respiratory: coarse throughout      Medications     - MEDICATION INSTRUCTIONS -         isosorbide mononitrate  120 mg Oral QAM     mirtazapine  15 mg Oral At Bedtime     sodium chloride (PF)  3 mL Intracatheter Q8H     traZODone  25 mg Oral At Bedtime       Data   Recent Labs   Lab 06/19/20  0640 06/18/20  2347 06/18/20  0411  06/17/20  2106   WBC 14.4*  --  5.9  --  6.4   HGB 11.5*  --  10.5*  --  11.4*   MCV 90  --  89  --  90     --  256  --  263     --  137  --  136   POTASSIUM 3.9  --  4.5  --  4.1   CHLORIDE 110*  --  109  --  107   CO2 18*  --  21  --  18*   BUN 30  --  22  --  22   CR 1.65*  --  1.40*  --  1.54*   ANIONGAP 10  --  7  --  11   AHMET 8.4*  --  8.6  --  9.0   *  --  139*  --  162*   ALBUMIN 3.3*  --  3.1*  --   --    PROTTOTAL 6.9  --  6.3*  --   --    BILITOTAL 0.8  --  0.6  --   --    ALKPHOS 71  --  70  --   --    ALT 22  --  23  --   --    AST 50*  --  19  --   --    TROPI 4.656* 0.432* 0.105*   < > 0.054*    < > = values in this interval not displayed.       No results found for this or any previous visit (from the past 24 hour(s)).

## 2020-06-21 NOTE — PROGRESS NOTES
SW:  Discharge Planner   Discharge Plans in progress: Patient now on comfort cares with anticipation that patient will pass in hospital. If patient does not pass quickly will need LTC vs Hospice Home.  Follow up plan: SW to continue to follow and assist with discharge planning.    JEANNA Chavis  Daytime (8:00am-4:30pm): [phone]  After-Hours SW Pager (4:30pm-11:30pm): [phone]            Entered by: Loretta Cabrera 06/21/2020 9:21 AM

## 2020-06-21 NOTE — PLAN OF CARE
Comfort care Pt. Respiratory rate in 30s. Gave prn Roxanol q1hr and morphine x1 for dyspnea/air hunger/ pain. Gave atropine and Robinol for secretions. Received PRN Ativan x2 for restless. Oral care provided. Incontinent of urine. Turn and repo done. Pt's daughter present at bedside throughout the day. Plan is transfer to LTC vs Hospice home if Pt still with us tomorrow. Continue to monitor.

## 2020-06-21 NOTE — PROVIDER NOTIFICATION
MD Notification    Notified Person: MD    Notified Person Name: Dr. Burgess    Notification Date/Time:6/20/20 2015    Notification Interaction: telephone    Purpose of Notification: increased agitation, wanting increase in ativan to hourly.    Orders Received:  Ativan linked order to hourly.    Comments:

## 2020-06-22 ASSESSMENT — ACTIVITIES OF DAILY LIVING (ADL): ADLS_ACUITY_SCORE: 22

## 2020-06-22 NOTE — PROGRESS NOTES
[facility] Death Pronouncement    Called by nursing staff to pronounce Neo Marcos dead.    Physical Exam: Unresponsive to noxious stimuli, Spontaneous respirations absent, Breath sounds absent, Carotid pulse absent, Heart sounds absent, Pupillary light reflex absent and Corneal blink reflex absent    Patient was pronounced dead at 8:17 PM, 2020.    No data filed        Active Problems:    Acute respiratory failure (H)       Infectious disease present?: NO    Communicable disease present? (examples: HIV, chicken pox, TB, Ebola, CJD) :  NO    Multi-drug resistant organism present? (example: MRSA): NO    Please consider an autopsy if any of the following exist:  NO Unexpected or unexplained death during or following any dental, medical, or surgical diagnostic treatment procedures.   NO Death of mother at or up to seven days after delivery.     NO All  and pediatric deaths.     NO Death where the cause is sufficiently obscure to delay completion of the death certificate.   NO Deaths in which autopsy would confirm a suspected illness/condition that would affect surviving family members or recipients of transplanted organs.     The following deaths must be reported to the 's Office:  NO A death that may be due entirely or in part to any factors other than natural disease (recent surgery, recent trauma, suspected abuse/neglect).   NO A death that may be an accident, suicide, or homicide.     NO Any sudden, unexpected death in which there is no prior history of significant heart disease or any other condition associated with sudden death.   NO A death under suspicious, unusual, or unexpected circumstances.    NO Any death which is apparently due to natural causes but in which the  does not have a personal physician familiar with the patient s medical history, social, or environmental situation or the circumstances of the terminal event.   NO Any death apparently due to Sudden Infant  Death Syndrome.     NO Deaths that occur during, in association with, or as consequences of a diagnostic, therapeutic, or anesthetic procedure.   NO Any death in which a fracture of a major bone has occurred within the past (6) six months.   NO A death of persons note seen by their physician within 120 days of demise.     NO Any death in which the  was an inmate of a public institution or was in the custody of Law Enforcement personnel.   NO  All unexpected deaths of children   NO Solid organ donors   NO Unidentified bodies   YES Deaths of persons whose bodies are to be cremated or otherwise disposed of so that the bodies will later be unavailable for examination;   NO Deaths unattended by a physician outside of a licensed healthcare facility or licensed residential hospice program   NO Deaths occurring within 24 hours of arrival to a health care facility if death is unexpected.    NO Deaths associated with the decedent s employment.   NO Deaths attributed to acts of terrorism.   NO Any death in which there is uncertainty as to whether it is a medical examiner s care should be discussed with the medical investigator.      Death Certificate to be directed to Dr. Suman Rankin, hospitalist.    Body disposition: Body released to the morgue.    NITIN Ang, [facility]  [facility]

## 2020-06-23 LAB — INTERPRETATION ECG - MUSE: NORMAL

## 2020-06-24 LAB
BACTERIA SPEC CULT: NO GROWTH
SPECIMEN SOURCE: NORMAL

## 2020-07-03 NOTE — DISCHARGE SUMMARY
[facility]    Death Summary - Hospitalist Service     Date of Admission:  6/17/2020  Date of Death: 06/21/2020  Discharging Provider: Suman Rankin, DO    Discharge Diagnoses   Acute hypoxic respiratory failure  Acute on chronic systolic heart failure  Ischemic cardiomyopathy  NSTEMI  Lactic acidosis secondary to NSTEMI  Severe diffuse multivessel CAD with recent NSTEMI in May 2020, medically managed  Staph epidermidis bacteremia most likely contaminant,?  Possible pneumonia  Patient was given antibiotics and steroids in the emergency department for concern of COPD or reactive airway disease component in addition to possible pneumonia or fluid on the lungs.  Patient was recently admitted in May for a non-ST elevation MI at [facility] but felt to be a poor interventional candidate and was medically managed.  He is readmitted here with acute worsening shortness of breath.  Patient lives at [facility] per report.  In the emergency department his creatinine returned at 1.54 with a carbon dioxide level of 18, basic natruretic peptide is greater than 5000, with a procalcitonin less than 0.05 and a troponin of 0.054.  Venous blood gas shows pH of 7.37 with a PCO2 of 34 and an oxygen level of 61 with a bicarbonate of 20.  White blood cell count is normal, however hemoglobin is mildly reduced at 11.4, no report of external blood loss.  Chest x-ray completed in the emergency department shows small bilateral pleural effusions, see report for further details.  Also shows lung base with suspicion for pneumonia and ill-defined consolidation in the right lower lung.  EKG on admission shows bundle-branch block with a rate of 81 bpm.  -Blood cultures x2 drawn at admission is growing staph epidermidis in 1 bottle.  He did not have leukocytosis or fever at admission.  Remains afebrile.  Low suspicion that he has true bacteremia or pneumonia especially given negative procalcitonin.  was treated with  vanco and zosyn which were discontinued on 6/19 with transition to comfort cares.  -IV diuresis with Lasix with some response, stopped with comfort cares  -baby aspirin, atorvastatin, Imdur, Toprol-XL discontinued with comfort cares.   -Echocardiogram on 6/18 showed EF at 40 to 45%, mild to moderate LVH, diffuse global hypokinesis with multiple regional variations consistent with ischemic cardiomyopathy, mild to moderate MR.  Reportedly no significant changes compared to echo report from 8/20/2020.      H/o COPD  H/o tobacco abuse  -No inhalers or nebulizers on PTA med list.  No CO2 retention on blood gas.  Do not suspect that he has a COPD exacerbation.  He did receive empiric steroids in the ED which have been discontinued.      Acute kidney injury, stage III: Patient with a creatinine of 1.54 on admission, baseline is approximately 1.  Did go up to 1.65. no further monitoring with comfort cares.      History of atrial fibrillation, hypertension: PTA on amlodipine, atorvastatin, metoprolol,  and Xarelto.     BPH: Stable. prior to admission on finasteride     Cause of death: acute hypoxic respiratory failure due to acute on chronic systolic CHF exacerbation due to ischemic cardiomyopathy.           Suman Rankin, [facility]  ______________________________________________________________________      Significant Results and Procedures   Results for orders placed or performed during the hospital encounter of 06/17/20   XR Chest Port 1 View    Narrative    CHEST ONE VIEW PORTABLE   6/17/2020 9:26 PM     HISTORY: Shortness of breath.    COMPARISON: None.      Impression    IMPRESSION: Ill-defined consolidation in the right lower lung is  suspicious for pneumonia. There is also mild infiltrate at the left  lung base. There are likely small bilateral pleural effusions, greater  on the right. 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 Pulmonary vascularity is within  normal limits.  Aortic calcification.    KAYLEEN SILVA MD   XR Chest Port 1 View    Narrative    EXAM: XR CHEST PORT 1 VW  LOCATION: [facility]  DATE/TIME: 2020 4:50 AM    INDICATION: Dyspnea.  COMPARISON: None.      Impression    IMPRESSION: Heart is enlarged. Aorta is atherosclerotic. No pneumothorax. Interstitial prominence. Bilateral lower lung edema or infiltrates greatest right lower lung. Small bilateral pleural effusions. CHF. Bilateral pleural and granulomatous   calcifications suspected. Diffuse demineralization. Monitoring electrodes.   Echocardiogram Complete    Narrative    [id number]  [id number]  [id number]^TARYN^GIL^AKI           [facility]  Echocardiography Laboratory  [address] [place] [id number]        Name: ABHINAV LAMB  MRN: [id number]  : 1931  Study Date: 2020 09:55 AM  Age: 88 yrs  Gender: Male  Patient Location: [facility]  Reason For Study: SOB  Ordering Physician: GIL CENTENO  Referring Physician: GIL CENTENO  Performed By: AILEEN Murillo     BSA: 1.8 m2  Height: 65 in  Weight: 158 lb  HR: 84  BP: 121/67 mmHg  _____________________________________________________________________________  __        Procedure  Complete Portable Echo Adult. Optison (NDC #[id number]) given intravenously.  _____________________________________________________________________________  __        Interpretation Summary     The visual ejection fraction is estimated at 40-45%.  Septal motion is consistent with conduction abnormality.  There is mild to moderate eccentric left ventricular hypertrophy. The left  ventricle is mildly dilated.  There is diffuse global hypokinesis with multiple regional variations - mid-  distal anterior/anteroseptal, basal-mid lateral, and inferior segments  consistent with ischemic cardiomyopathy.  The right ventricle is normal in size and function.  There is mild to moderate (1-2+) mitral  regurgitation. There is mild mitral  stenosis with mG of 5 mm Hg.  Right ventricular systolic pressure could not be approximated due to  inadequate tricuspid regurgitation.  The inferior vena cava was normal in size with preserved respiratory  variability.     No significant changes compared to echo report from May 2020 in care-  everywhere. Of note LA was severely enlarged on that study. This present  study, it may have been poorly angulated.     _____________________________________________________________________________  __        Left Ventricle  The left ventricle is mildly dilated. There is mild to moderate eccentric left  ventricular hypertrophy. Diastolic Doppler findings (E/E' ratio and/or other  parameters) suggest left ventricular filling pressures are indeterminate. The  visual ejection fraction is estimated at 40-45%. Septal motion is consistent  with conduction abnormality.     Right Ventricle  The right ventricle is normal in size and function.     Atria  Normal left atrial size. Right atrial size is normal.     Mitral Valve  There is mild to moderate (1-2+) mitral regurgitation. There is mild mitral  stenosis. The mean mitral valve gradient is 4.6 mmHg.        Tricuspid Valve  There is trace tricuspid regurgitation. Right ventricular systolic pressure  could not be approximated due to inadequate tricuspid regurgitation.     Aortic Valve  The aortic valve is not well visualized. No aortic regurgitation is present.  No hemodynamically significant valvular aortic stenosis.     Pulmonic Valve  The pulmonic valve is not well visualized.     Vessels  The aortic root is not well visualized. The aortic root is normal size. The  inferior vena cava was normal in size with preserved respiratory variability.     Pericardium  There is no pericardial effusion.     _____________________________________________________________________________  __  MMode/2D Measurements & Calculations  IVSd: 0.88 cm  LVIDd: 6.7  cm  LVIDs: 4.6 cm  LVPWd: 1.1 cm  FS: 32.2 %  LV mass(C)d: 295.3 grams  LV mass(C)dI: 165.0 grams/m2     Ao root diam: 3.6 cm  LA dimension: 4.6 cm  asc Aorta Diam: 3.3 cm  LA/Ao: 1.3  LVOT diam: 2.0 cm  LVOT area: 3.2 cm2  LA Volume Indexed (AL/bp): 27.7 ml/m2  RWT: 0.32        Doppler Measurements & Calculations  MV E max moses: 93.0 cm/sec  MV A max moses: 108.3 cm/sec  MV E/A: 0.86  MV max P.2 mmHg  MV mean P.6 mmHg  MV V2 VTI: 45.5 cm  MVA(VTI): 1.6 cm2  MV dec time: 0.19 sec     LV V1 max PG: 3.5 mmHg  LV V1 max: 93.0 cm/sec  LV V1 VTI: 22.6 cm  SV(LVOT): 72.2 ml  SI(LVOT): 40.4 ml/m2  PA acc time: 0.13 sec  Pulm Sys Moses: 54.6 cm/sec  Pulm Davis Moses: 44.5 cm/sec  Pulm S/D: 1.2  E/E' av.5  Lateral E/e': 9.0  Medial E/e': 14.0           _____________________________________________________________________________  __           Report approved by: Jayshree Sarkar 2020 03:16 PM            Consultations This Hospital Stay   PHYSICAL THERAPY ADULT IP CONSULT  CARDIOLOGY IP CONSULT  CARE TRANSITION RN/SW IP CONSULT  PHARMACY TO DOSE [facility]    Primary Care Physician   Son Jose Zhouen    Time Spent on this Encounter   I, Suman Rankin DO, personally saw the patient today and spent greater than 30 minutes discharging this patient.